# Patient Record
Sex: MALE | Race: OTHER | Employment: OTHER | URBAN - METROPOLITAN AREA
[De-identification: names, ages, dates, MRNs, and addresses within clinical notes are randomized per-mention and may not be internally consistent; named-entity substitution may affect disease eponyms.]

---

## 2020-06-10 ENCOUNTER — HOSPITAL ENCOUNTER (OUTPATIENT)
Age: 75
Setting detail: OBSERVATION
Discharge: HOME | End: 2020-06-12
Attending: EMERGENCY MEDICINE | Admitting: HOSPITALIST
Payer: MEDICARE

## 2020-06-10 DIAGNOSIS — N17.9 AKI (ACUTE KIDNEY INJURY) (HCC): ICD-10-CM

## 2020-06-10 DIAGNOSIS — K92.0 COFFEE GROUND EMESIS: Primary | ICD-10-CM

## 2020-06-10 DIAGNOSIS — R73.9 HYPERGLYCEMIA: ICD-10-CM

## 2020-06-10 PROBLEM — K92.2 UGIB (UPPER GASTROINTESTINAL BLEED): Status: ACTIVE | Noted: 2020-06-10

## 2020-06-10 LAB
ABO + RH BLD: NORMAL
ALBUMIN SERPL-MCNC: 3.4 G/DL (ref 3.4–5)
ALBUMIN/GLOB SERPL: 0.8 {RATIO} (ref 0.8–1.7)
ALP SERPL-CCNC: 108 U/L (ref 45–117)
ALT SERPL-CCNC: 22 U/L (ref 16–61)
ANION GAP SERPL CALC-SCNC: 6 MMOL/L (ref 3–18)
APTT PPP: 41.1 SEC (ref 23–36.4)
AST SERPL-CCNC: 22 U/L (ref 10–38)
ATRIAL RATE: 74 BPM
BASOPHILS # BLD: 0 K/UL (ref 0–0.1)
BASOPHILS NFR BLD: 0 % (ref 0–2)
BILIRUB SERPL-MCNC: 0.8 MG/DL (ref 0.2–1)
BLOOD GROUP ANTIBODIES SERPL: NORMAL
BUN SERPL-MCNC: 59 MG/DL (ref 7–18)
BUN/CREAT SERPL: 20 (ref 12–20)
CALCIUM SERPL-MCNC: 8.6 MG/DL (ref 8.5–10.1)
CALCULATED P AXIS, ECG09: 53 DEGREES
CALCULATED R AXIS, ECG10: 7 DEGREES
CALCULATED T AXIS, ECG11: 135 DEGREES
CHLORIDE SERPL-SCNC: 109 MMOL/L (ref 100–111)
CO2 SERPL-SCNC: 26 MMOL/L (ref 21–32)
CREAT SERPL-MCNC: 2.94 MG/DL (ref 0.6–1.3)
DIAGNOSIS, 93000: NORMAL
DIFFERENTIAL METHOD BLD: ABNORMAL
EOSINOPHIL # BLD: 0.1 K/UL (ref 0–0.4)
EOSINOPHIL NFR BLD: 1 % (ref 0–5)
ERYTHROCYTE [DISTWIDTH] IN BLOOD BY AUTOMATED COUNT: 14.6 % (ref 11.6–14.5)
GLOBULIN SER CALC-MCNC: 4.3 G/DL (ref 2–4)
GLUCOSE SERPL-MCNC: 203 MG/DL (ref 74–99)
HCT VFR BLD AUTO: 43.5 % (ref 36–48)
HEMOCCULT STL QL: NEGATIVE
HGB BLD-MCNC: 13.4 G/DL (ref 13–16)
INR PPP: 2.6 (ref 0.8–1.2)
LIPASE SERPL-CCNC: 122 U/L (ref 73–393)
LYMPHOCYTES # BLD: 0.9 K/UL (ref 0.9–3.6)
LYMPHOCYTES NFR BLD: 10 % (ref 21–52)
MCH RBC QN AUTO: 27.9 PG (ref 24–34)
MCHC RBC AUTO-ENTMCNC: 30.8 G/DL (ref 31–37)
MCV RBC AUTO: 90.4 FL (ref 74–97)
MONOCYTES # BLD: 0.6 K/UL (ref 0.05–1.2)
MONOCYTES NFR BLD: 6 % (ref 3–10)
NEUTS SEG # BLD: 7.7 K/UL (ref 1.8–8)
NEUTS SEG NFR BLD: 83 % (ref 40–73)
P-R INTERVAL, ECG05: 166 MS
PLATELET # BLD AUTO: 178 K/UL (ref 135–420)
PMV BLD AUTO: 11.1 FL (ref 9.2–11.8)
POTASSIUM SERPL-SCNC: 4.7 MMOL/L (ref 3.5–5.5)
PROT SERPL-MCNC: 7.7 G/DL (ref 6.4–8.2)
PROTHROMBIN TIME: 27.7 SEC (ref 11.5–15.2)
Q-T INTERVAL, ECG07: 380 MS
QRS DURATION, ECG06: 96 MS
QTC CALCULATION (BEZET), ECG08: 421 MS
RBC # BLD AUTO: 4.81 M/UL (ref 4.7–5.5)
SODIUM SERPL-SCNC: 141 MMOL/L (ref 136–145)
SPECIMEN EXP DATE BLD: NORMAL
VENTRICULAR RATE, ECG03: 74 BPM
WBC # BLD AUTO: 9.3 K/UL (ref 4.6–13.2)

## 2020-06-10 PROCEDURE — 65660000000 HC RM CCU STEPDOWN

## 2020-06-10 PROCEDURE — 85730 THROMBOPLASTIN TIME PARTIAL: CPT

## 2020-06-10 PROCEDURE — 96361 HYDRATE IV INFUSION ADD-ON: CPT

## 2020-06-10 PROCEDURE — 74011250636 HC RX REV CODE- 250/636: Performed by: FAMILY MEDICINE

## 2020-06-10 PROCEDURE — 85610 PROTHROMBIN TIME: CPT

## 2020-06-10 PROCEDURE — 82272 OCCULT BLD FECES 1-3 TESTS: CPT

## 2020-06-10 PROCEDURE — 65390000012 HC CONDITION CODE 44 OBSERVATION

## 2020-06-10 PROCEDURE — 93005 ELECTROCARDIOGRAM TRACING: CPT

## 2020-06-10 PROCEDURE — 83690 ASSAY OF LIPASE: CPT

## 2020-06-10 PROCEDURE — 85025 COMPLETE CBC W/AUTO DIFF WBC: CPT

## 2020-06-10 PROCEDURE — 74011250636 HC RX REV CODE- 250/636: Performed by: EMERGENCY MEDICINE

## 2020-06-10 PROCEDURE — 96374 THER/PROPH/DIAG INJ IV PUSH: CPT

## 2020-06-10 PROCEDURE — 99284 EMERGENCY DEPT VISIT MOD MDM: CPT

## 2020-06-10 PROCEDURE — 80053 COMPREHEN METABOLIC PANEL: CPT

## 2020-06-10 PROCEDURE — 86900 BLOOD TYPING SEROLOGIC ABO: CPT

## 2020-06-10 PROCEDURE — C9113 INJ PANTOPRAZOLE SODIUM, VIA: HCPCS | Performed by: EMERGENCY MEDICINE

## 2020-06-10 RX ORDER — ONDANSETRON 2 MG/ML
4 INJECTION INTRAMUSCULAR; INTRAVENOUS
Status: DISCONTINUED | OUTPATIENT
Start: 2020-06-10 | End: 2020-06-12 | Stop reason: HOSPADM

## 2020-06-10 RX ORDER — MAGNESIUM SULFATE 100 %
16 CRYSTALS MISCELLANEOUS AS NEEDED
Status: DISCONTINUED | OUTPATIENT
Start: 2020-06-10 | End: 2020-06-12 | Stop reason: HOSPADM

## 2020-06-10 RX ORDER — SODIUM CHLORIDE 0.9 % (FLUSH) 0.9 %
5-40 SYRINGE (ML) INJECTION AS NEEDED
Status: DISCONTINUED | OUTPATIENT
Start: 2020-06-10 | End: 2020-06-12 | Stop reason: HOSPADM

## 2020-06-10 RX ORDER — METOPROLOL TARTRATE 25 MG/1
25 TABLET, FILM COATED ORAL EVERY 12 HOURS
Status: DISCONTINUED | OUTPATIENT
Start: 2020-06-10 | End: 2020-06-12 | Stop reason: HOSPADM

## 2020-06-10 RX ORDER — HYDRALAZINE HYDROCHLORIDE 25 MG/1
25 TABLET, FILM COATED ORAL 2 TIMES DAILY
COMMUNITY

## 2020-06-10 RX ORDER — MELATONIN
2000 DAILY
Status: DISCONTINUED | OUTPATIENT
Start: 2020-06-11 | End: 2020-06-12 | Stop reason: HOSPADM

## 2020-06-10 RX ORDER — PANTOPRAZOLE SODIUM 40 MG/10ML
80 INJECTION, POWDER, LYOPHILIZED, FOR SOLUTION INTRAVENOUS
Status: COMPLETED | OUTPATIENT
Start: 2020-06-10 | End: 2020-06-10

## 2020-06-10 RX ORDER — SODIUM CHLORIDE 9 MG/ML
75 INJECTION, SOLUTION INTRAVENOUS CONTINUOUS
Status: DISCONTINUED | OUTPATIENT
Start: 2020-06-10 | End: 2020-06-12 | Stop reason: HOSPADM

## 2020-06-10 RX ORDER — HYDRALAZINE HYDROCHLORIDE 25 MG/1
25 TABLET, FILM COATED ORAL 2 TIMES DAILY
Status: DISCONTINUED | OUTPATIENT
Start: 2020-06-11 | End: 2020-06-12 | Stop reason: HOSPADM

## 2020-06-10 RX ORDER — DEXTROSE MONOHYDRATE 100 MG/ML
125-250 INJECTION, SOLUTION INTRAVENOUS AS NEEDED
Status: DISCONTINUED | OUTPATIENT
Start: 2020-06-10 | End: 2020-06-12 | Stop reason: HOSPADM

## 2020-06-10 RX ORDER — CHOLECALCIFEROL (VITAMIN D3) 125 MCG
5000 CAPSULE ORAL DAILY
COMMUNITY

## 2020-06-10 RX ORDER — WARFARIN 6 MG/1
6 TABLET ORAL DAILY
COMMUNITY

## 2020-06-10 RX ORDER — SPIRONOLACTONE 25 MG/1
25 TABLET ORAL DAILY
Status: DISCONTINUED | OUTPATIENT
Start: 2020-06-11 | End: 2020-06-12 | Stop reason: HOSPADM

## 2020-06-10 RX ORDER — SODIUM CHLORIDE 0.9 % (FLUSH) 0.9 %
5-40 SYRINGE (ML) INJECTION EVERY 8 HOURS
Status: DISCONTINUED | OUTPATIENT
Start: 2020-06-10 | End: 2020-06-12 | Stop reason: HOSPADM

## 2020-06-10 RX ORDER — ATORVASTATIN CALCIUM 20 MG/1
20 TABLET, FILM COATED ORAL DAILY
Status: DISCONTINUED | OUTPATIENT
Start: 2020-06-11 | End: 2020-06-12 | Stop reason: HOSPADM

## 2020-06-10 RX ORDER — MULTIVITAMIN
2000 TABLET ORAL DAILY
COMMUNITY

## 2020-06-10 RX ORDER — ISOSORBIDE MONONITRATE 30 MG/1
30 TABLET, EXTENDED RELEASE ORAL DAILY
Status: DISCONTINUED | OUTPATIENT
Start: 2020-06-11 | End: 2020-06-12 | Stop reason: HOSPADM

## 2020-06-10 RX ORDER — INSULIN LISPRO 100 [IU]/ML
INJECTION, SOLUTION INTRAVENOUS; SUBCUTANEOUS
Status: DISCONTINUED | OUTPATIENT
Start: 2020-06-11 | End: 2020-06-12 | Stop reason: HOSPADM

## 2020-06-10 RX ORDER — ACETAMINOPHEN 160 MG/5ML
400 SUSPENSION, ORAL (FINAL DOSE FORM) ORAL DAILY
COMMUNITY

## 2020-06-10 RX ADMIN — SODIUM CHLORIDE 500 ML: 900 INJECTION, SOLUTION INTRAVENOUS at 21:04

## 2020-06-10 RX ADMIN — SODIUM CHLORIDE 125 ML/HR: 900 INJECTION, SOLUTION INTRAVENOUS at 22:15

## 2020-06-10 RX ADMIN — SODIUM CHLORIDE 500 ML: 900 INJECTION, SOLUTION INTRAVENOUS at 20:09

## 2020-06-10 RX ADMIN — PANTOPRAZOLE SODIUM 80 MG: 40 INJECTION, POWDER, FOR SOLUTION INTRAVENOUS at 20:09

## 2020-06-10 NOTE — ED TRIAGE NOTES
Patient has been vomiting since 4am. Wife states vomit now looks like coffee grounds and PCP instructed to go to ED. Patients wife reports patient is on warfarin. Patient noted to have right arm weakness and right facial droop. Patient states hard to move right arm. Patients wife reports history of stroke in previous years and that this is not new.

## 2020-06-10 NOTE — ED PROVIDER NOTES
EMERGENCY DEPARTMENT HISTORY AND PHYSICAL EXAM    Date: 6/10/2020  Patient Name: Ranulfo Morin    History of Presenting Illness     Chief Complaint   Patient presents with    Vomiting    Extremity Weakness         History Provided By: Patient    Nir Cornejo is a 77 y.o. male with PMHX of stroke, MI, hypertension, CAD, on Coumadin who presents to the emergency department C/O vomiting around 4 AM.  Patient has been having nausea no abdominal pain. Wife reports that his emesis looks coffee-ground and took a picture. Patient had stroke in the past and is a poor historian not able to elaborate much on symptoms. Wife reports that he has had episodes 3 of nausea and vomiting since last Monday. She says that he is been complaining of burning and acid reflux type of pain after meals. She says today was the first time he threw up coffee ground emesis. Reports his bowel movements today look totally normal. She says he otherwise looks at his baseline and has not been having pain. Patient took meds today after throwing up but has not had anything to eat/drink. PCP: Chrissy Javier MD    Current Outpatient Medications   Medication Sig Dispense Refill    warfarin (COUMADIN) 6 mg tablet Take 6 mg by mouth daily. T,TH,SUN      hydrALAZINE (APRESOLINE) 25 mg tablet Take 25 mg by mouth two (2) times a day.  cholecalciferol, vitamin D3, (Vitamin D3) 50 mcg (2,000 unit) tab Take 5,000 Units by mouth daily.  coenzyme Q-10 (Co Q-10) 200 mg capsule Take 400 mg by mouth daily.  cinnamon bark (Cinnamon) 500 mg cap Take 2,000 mg by mouth daily.  aspirin delayed-release 81 mg tablet Take 1 Tab by mouth daily. 30 Tab 0    warfarin (COUMADIN) 3 mg tablet Take 2 Tabs by mouth daily. (Patient taking differently: Take 3 mg by mouth daily. M,W,F,SAT) 60 Tab 0    glipiZIDE (GLUCOTROL) 5 mg tablet Take 2.5 mg by mouth daily.  With breakfast      spironolactone (ALDACTONE) 25 mg tablet Take 1 Tab by mouth daily. 30 Tab 0    metoprolol (LOPRESSOR) 25 mg tablet Take 1 Tab by mouth every twelve (12) hours. 60 Tab 0    isosorbide mononitrate ER (IMDUR) 30 mg tablet Take 1 Tab by mouth daily. 30 Tab 0    furosemide (LASIX) 40 mg tablet Take 1 Tab by mouth two (2) times a day. (Patient taking differently: Take 20 mg by mouth daily.) 60 Tab 0    atorvastatin (LIPITOR) 20 mg tablet Take 20 mg by mouth daily.  krill-omega-3-dha-epa-lipids 041-15-97-25 mg cap Take  by mouth. Past History     Past Medical History:  Past Medical History:   Diagnosis Date    CAD (coronary artery disease)     Diabetes [facility] [address]     Hypercholesterolemia     Hypertension     Myocardial infarction ([facility] [address]     S/P coronary artery stent placement 2009    Stroke [facility])     patient states \"little\"    Stroke ([facility] [address] 2011    sugar related (BS >400)       Past Surgical History:  Past Surgical History:   Procedure Laterality Date    CARDIAC SURG PROCEDURE UNLIST  [id number]    angioplasty and stenting    CORONARY ARTERY ANGIOGRAM  2016    HX CORONARY STENT PLACEMENT  2009    stents    HX HEART CATHETERIZATION  2009    and stents    HX HERNIA REPAIR      HX OTHER SURGICAL      Nephrolithotomy    HX PROSTATECTOMY  2003    HX PTCA  2009    LEFT HEART PERCUTANEOUS  2016       Family History:  History reviewed. No pertinent family history. Social History:  Social History     Tobacco Use    Smoking status: Former Smoker     Packs/day: 1.00     Years: 45.00     Pack years: 45.00     Types: Cigarettes     Last attempt to quit: 3/31/2002     Years since quittin.2    Smokeless tobacco: Never Used   Substance Use Topics    Alcohol use: No    Drug use: No       Allergies:  No Known Allergies      Review of Systems   Review of Systems   Constitutional: Positive for appetite change. Negative for chills and fever. Respiratory: Negative for shortness of breath.     Cardiovascular: Negative for chest pain.   Gastrointestinal: Positive for nausea and vomiting. Negative for abdominal pain, anal bleeding and blood in stool. All other systems reviewed and are negative. Physical Exam     Vitals:    06/10/20 1915 06/10/20 2006   BP: 104/53 94/65   Pulse: 72    Resp: 16    Temp: 97.8 °F (36.6 °C)    SpO2: 99% 98%   Weight: 90.3 kg (199 lb)      Physical Exam  Constitutional:       Appearance: He is ill-appearing (appears nauseous). He is not toxic-appearing. HENT:      Head: Normocephalic and atraumatic. Eyes:      Extraocular Movements: Extraocular movements intact. Comments: Clouded right cornea     Neck:      Musculoskeletal: Normal range of motion and neck supple. Cardiovascular:      Rate and Rhythm: Normal rate and regular rhythm. Pulses: Normal pulses. Heart sounds: Normal heart sounds. No murmur. Pulmonary:      Effort: Pulmonary effort is normal.      Breath sounds: Normal breath sounds. No wheezing. Abdominal:      General: There is no distension. Palpations: Abdomen is soft. Tenderness: There is no abdominal tenderness. There is no guarding or rebound. Musculoskeletal:      Right lower leg: No edema. Left lower leg: No edema. Skin:     General: Skin is warm and dry. Neurological:      Mental Status: He is alert. Mental status is at baseline. Cranial Nerves: Facial asymmetry present. Motor: Weakness (RUE,RLE (chronic) and abnormal muscle tone (RUE) present.              Diagnostic Study Results     Labs -     Recent Results (from the past 12 hour(s))   CBC WITH AUTOMATED DIFF    Collection Time: 06/10/20  7:40 PM   Result Value Ref Range    WBC 9.3 4.6 - 13.2 K/uL    RBC 4.81 4.70 - 5.50 M/uL    HGB 13.4 13.0 - 16.0 g/dL    HCT 43.5 36.0 - 48.0 %    MCV 90.4 74.0 - 97.0 FL    MCH 27.9 24.0 - 34.0 PG    MCHC 30.8 (L) 31.0 - 37.0 g/dL    RDW 14.6 (H) 11.6 - 14.5 %    PLATELET 344 613 - 838 K/uL    MPV 11.1 9.2 - 11.8 FL    NEUTROPHILS 83 (H) 40 - 73 %    LYMPHOCYTES 10 (L) 21 - 52 %    MONOCYTES 6 3 - 10 %    EOSINOPHILS 1 0 - 5 %    BASOPHILS 0 0 - 2 %    ABS. NEUTROPHILS 7.7 1.8 - 8.0 K/UL    ABS. LYMPHOCYTES 0.9 0.9 - 3.6 K/UL    ABS. MONOCYTES 0.6 0.05 - 1.2 K/UL    ABS. EOSINOPHILS 0.1 0.0 - 0.4 K/UL    ABS. BASOPHILS 0.0 0.0 - 0.1 K/UL    DF AUTOMATED     METABOLIC PANEL, COMPREHENSIVE    Collection Time: 06/10/20  7:40 PM   Result Value Ref Range    Sodium 141 136 - 145 mmol/L    Potassium 4.7 3.5 - 5.5 mmol/L    Chloride 109 100 - 111 mmol/L    CO2 26 21 - 32 mmol/L    Anion gap 6 3.0 - 18 mmol/L    Glucose 203 (H) 74 - 99 mg/dL    BUN 59 (H) 7.0 - 18 MG/DL    Creatinine 2.94 (H) 0.6 - 1.3 MG/DL    BUN/Creatinine ratio 20 12 - 20      GFR est AA 26 (L) >60 ml/min/1.73m2    GFR est non-AA 21 (L) >60 ml/min/1.73m2    Calcium 8.6 8.5 - 10.1 MG/DL    Bilirubin, total 0.8 0.2 - 1.0 MG/DL    ALT (SGPT) 22 16 - 61 U/L    AST (SGOT) 22 10 - 38 U/L    Alk.  phosphatase 108 45 - 117 U/L    Protein, total 7.7 6.4 - 8.2 g/dL    Albumin 3.4 3.4 - 5.0 g/dL    Globulin 4.3 (H) 2.0 - 4.0 g/dL    A-G Ratio 0.8 0.8 - 1.7     LIPASE    Collection Time: 06/10/20  7:40 PM   Result Value Ref Range    Lipase 122 73 - 393 U/L   PROTHROMBIN TIME + INR    Collection Time: 06/10/20  7:40 PM   Result Value Ref Range    Prothrombin time 27.7 (H) 11.5 - 15.2 sec    INR 2.6 (H) 0.8 - 1.2     PTT    Collection Time: 06/10/20  7:40 PM   Result Value Ref Range    aPTT 41.1 (H) 23.0 - 36.4 SEC   EKG, 12 LEAD, INITIAL    Collection Time: 06/10/20  7:55 PM   Result Value Ref Range    Ventricular Rate 74 BPM    Atrial Rate 74 BPM    P-R Interval 166 ms    QRS Duration 96 ms    Q-T Interval 380 ms    QTC Calculation (Bezet) 421 ms    Calculated P Axis 53 degrees    Calculated R Axis 7 degrees    Calculated T Axis 135 degrees    Diagnosis       Normal sinus rhythm  T wave abnormality, consider anterolateral ischemia  Abnormal ECG  Confirmed by Rishabh Del Real MD, Cal Cervantes ([id number]) on 6/10/2020 8:45:15 PM     OCCULT BLOOD, STOOL    Collection Time: 06/10/20  8:08 PM   Result Value Ref Range    Occult blood, stool Negative NEG         Radiologic Studies -   No orders to display     CT Results  (Last 48 hours)    None        CXR Results  (Last 48 hours)    None          Medications given in the ED-  Medications   pantoprazole (PROTONIX) injection 80 mg (80 mg IntraVENous Given 6/10/20 2009)   sodium chloride 0.9 % bolus infusion 500 mL (0 mL IntraVENous IV Completed 6/10/20 2104)   sodium chloride 0.9 % bolus infusion 500 mL (500 mL IntraVENous New Bag 6/10/20 2104)         Medical Decision Making   I am the first provider for this patient. I reviewed the vital signs, available nursing notes, past medical history, past surgical history, family history and social history. Vital Signs-Reviewed the patient's vital signs. Pulse Oximetry Analysis - 99% on RA, normal       EKG interpretation: (Preliminary)  EKG read by Dr. Cuca Richards at [id number]   Normal intervals, normal axis, lateral T wave inversions, similar to prior study 2016    Records Reviewed: Nursing Notes, Old Medical Records and Previous electrocardiograms    Provider Notes (Medical Decision Making): Lloyd Negrete is a 77 y.o. male with UGIB on coumadin. Hemodynamically stable. Melena. Single episode of coffee-ground emesis this AM.  Plan on discussing with GI and admission for scope    Procedures:  Procedures    ED Course:   CONSULT NOTE:   8:19 PM   I discussed care with Dr Nyasia Lagos It was a standard discussion, including history of patients chief complaint, available diagnostic results, and treatment course. Discussed case. Recommends PPI, holding Coumadin and aspirin and will be able to scope patient on Friday    CONSULT NOTE:   9:05 PM   I discussed care with Dr Urmila Lou It was a standard discussion, including history of patients chief complaint, available diagnostic results, and treatment course. Discussed case.  Agrees with admission. Diagnosis and Disposition     Critical Care Time:     Core Measures:  For Hospitalized Patients:    1. Hospitalization Decision Time:  The decision to hospitalize the patient was made by Dr Merlinda Elms at 2106 on 6/10/2020    2. Aspirin: Aspirin was not given because the patient is a bleeding risk    9:06 PM  Patient is being admitted to the hospital by Dr Tonja Woo. The results of their tests and reasons for their admission have been discussed with them and/or available family. They convey agreement and understanding for the need to be admitted and for their admission diagnosis. CONDITIONS ON ADMISSION:  Sepsis is not present at the time of admission. Deep Vein Thrombosis is not present at the time of admission. Thrombosis is not present at the time of admission. Urinary Tract Infection is not present at the time of admission. Pneumonia is not present at the time of admission. MRSA is not present at the time of admission. Wound infection is not present at the time of admission. Pressure Ulcer is not present at the time of admission. CLINICAL IMPRESSION:    1. Coffee ground emesis    2. АЛЕКСАНДР (acute kidney injury) ([facility] [address]    3. Hyperglycemia      _______________________________      Please note that this dictation was completed with [employer], the computer voice recognition software. Quite often unanticipated grammatical, syntax, homophones, and other interpretive errors are inadvertently transcribed by the computer software. Please disregard these errors. Please excuse any errors that have escaped final proofreading.

## 2020-06-11 PROBLEM — N17.9 ACUTE RENAL FAILURE SUPERIMPOSED ON STAGE 3 CHRONIC KIDNEY DISEASE (HCC): Status: ACTIVE | Noted: 2020-06-11

## 2020-06-11 PROBLEM — N18.30 ACUTE RENAL FAILURE SUPERIMPOSED ON STAGE 3 CHRONIC KIDNEY DISEASE (HCC): Status: ACTIVE | Noted: 2020-06-11

## 2020-06-11 PROBLEM — K22.10 EROSIVE ESOPHAGITIS: Status: ACTIVE | Noted: 2020-06-11

## 2020-06-11 LAB
ALBUMIN SERPL-MCNC: 2.6 G/DL (ref 3.4–5)
ALBUMIN/GLOB SERPL: 0.8 {RATIO} (ref 0.8–1.7)
ALP SERPL-CCNC: 76 U/L (ref 45–117)
ALT SERPL-CCNC: 17 U/L (ref 16–61)
ANION GAP SERPL CALC-SCNC: 6 MMOL/L (ref 3–18)
AST SERPL-CCNC: 18 U/L (ref 10–38)
BASOPHILS # BLD: 0 K/UL (ref 0–0.1)
BASOPHILS NFR BLD: 0 % (ref 0–2)
BILIRUB DIRECT SERPL-MCNC: 0.1 MG/DL (ref 0–0.2)
BILIRUB SERPL-MCNC: 0.5 MG/DL (ref 0.2–1)
BUN SERPL-MCNC: 55 MG/DL (ref 7–18)
BUN/CREAT SERPL: 23 (ref 12–20)
CALCIUM SERPL-MCNC: 7.6 MG/DL (ref 8.5–10.1)
CHLORIDE SERPL-SCNC: 113 MMOL/L (ref 100–111)
CO2 SERPL-SCNC: 24 MMOL/L (ref 21–32)
CREAT SERPL-MCNC: 2.4 MG/DL (ref 0.6–1.3)
DIFFERENTIAL METHOD BLD: ABNORMAL
EOSINOPHIL # BLD: 0.1 K/UL (ref 0–0.4)
EOSINOPHIL NFR BLD: 1 % (ref 0–5)
ERYTHROCYTE [DISTWIDTH] IN BLOOD BY AUTOMATED COUNT: 14.8 % (ref 11.6–14.5)
GLOBULIN SER CALC-MCNC: 3.2 G/DL (ref 2–4)
GLUCOSE BLD STRIP.AUTO-MCNC: 113 MG/DL (ref 70–110)
GLUCOSE BLD STRIP.AUTO-MCNC: 115 MG/DL (ref 70–110)
GLUCOSE BLD STRIP.AUTO-MCNC: 130 MG/DL (ref 70–110)
GLUCOSE BLD STRIP.AUTO-MCNC: 93 MG/DL (ref 70–110)
GLUCOSE SERPL-MCNC: 100 MG/DL (ref 74–99)
HCT VFR BLD AUTO: 32.9 % (ref 36–48)
HCT VFR BLD AUTO: 33.8 % (ref 36–48)
HCT VFR BLD AUTO: 35.5 % (ref 36–48)
HCT VFR BLD AUTO: 36.9 % (ref 36–48)
HGB BLD-MCNC: 10.1 G/DL (ref 13–16)
HGB BLD-MCNC: 10.3 G/DL (ref 13–16)
HGB BLD-MCNC: 10.8 G/DL (ref 13–16)
HGB BLD-MCNC: 11.4 G/DL (ref 13–16)
LYMPHOCYTES # BLD: 1.4 K/UL (ref 0.9–3.6)
LYMPHOCYTES NFR BLD: 18 % (ref 21–52)
MCH RBC QN AUTO: 27.6 PG (ref 24–34)
MCHC RBC AUTO-ENTMCNC: 30.4 G/DL (ref 31–37)
MCV RBC AUTO: 90.8 FL (ref 74–97)
MONOCYTES # BLD: 0.7 K/UL (ref 0.05–1.2)
MONOCYTES NFR BLD: 8 % (ref 3–10)
NEUTS SEG # BLD: 5.6 K/UL (ref 1.8–8)
NEUTS SEG NFR BLD: 73 % (ref 40–73)
PLATELET # BLD AUTO: 142 K/UL (ref 135–420)
PMV BLD AUTO: 11.4 FL (ref 9.2–11.8)
POTASSIUM SERPL-SCNC: 4 MMOL/L (ref 3.5–5.5)
PROT SERPL-MCNC: 5.8 G/DL (ref 6.4–8.2)
RBC # BLD AUTO: 3.91 M/UL (ref 4.7–5.5)
SODIUM SERPL-SCNC: 143 MMOL/L (ref 136–145)
WBC # BLD AUTO: 7.7 K/UL (ref 4.6–13.2)

## 2020-06-11 PROCEDURE — 74011250637 HC RX REV CODE- 250/637: Performed by: FAMILY MEDICINE

## 2020-06-11 PROCEDURE — 74011250636 HC RX REV CODE- 250/636: Performed by: INTERNAL MEDICINE

## 2020-06-11 PROCEDURE — 82962 GLUCOSE BLOOD TEST: CPT

## 2020-06-11 PROCEDURE — 65390000012 HC CONDITION CODE 44 OBSERVATION

## 2020-06-11 PROCEDURE — 36415 COLL VENOUS BLD VENIPUNCTURE: CPT

## 2020-06-11 PROCEDURE — 80048 BASIC METABOLIC PNL TOTAL CA: CPT

## 2020-06-11 PROCEDURE — 77030040361 HC SLV COMPR DVT MDII -B: Performed by: INTERNAL MEDICINE

## 2020-06-11 PROCEDURE — C9113 INJ PANTOPRAZOLE SODIUM, VIA: HCPCS | Performed by: FAMILY MEDICINE

## 2020-06-11 PROCEDURE — 74011250636 HC RX REV CODE- 250/636: Performed by: FAMILY MEDICINE

## 2020-06-11 PROCEDURE — 97166 OT EVAL MOD COMPLEX 45 MIN: CPT

## 2020-06-11 PROCEDURE — 99218 HC RM OBSERVATION: CPT

## 2020-06-11 PROCEDURE — 97161 PT EVAL LOW COMPLEX 20 MIN: CPT

## 2020-06-11 PROCEDURE — 85018 HEMOGLOBIN: CPT

## 2020-06-11 PROCEDURE — 74011000250 HC RX REV CODE- 250: Performed by: FAMILY MEDICINE

## 2020-06-11 PROCEDURE — 85025 COMPLETE CBC W/AUTO DIFF WBC: CPT

## 2020-06-11 PROCEDURE — 74011250637 HC RX REV CODE- 250/637: Performed by: HOSPITALIST

## 2020-06-11 PROCEDURE — 76040000007: Performed by: INTERNAL MEDICINE

## 2020-06-11 PROCEDURE — G0500 MOD SEDAT ENDO SERVICE >5YRS: HCPCS | Performed by: INTERNAL MEDICINE

## 2020-06-11 PROCEDURE — 80076 HEPATIC FUNCTION PANEL: CPT

## 2020-06-11 RX ORDER — SODIUM CHLORIDE 9 MG/ML
1000 INJECTION, SOLUTION INTRAVENOUS CONTINUOUS
Status: CANCELLED | OUTPATIENT
Start: 2020-06-11 | End: 2020-06-11

## 2020-06-11 RX ORDER — FENTANYL CITRATE 50 UG/ML
100 INJECTION, SOLUTION INTRAMUSCULAR; INTRAVENOUS
Status: DISCONTINUED | OUTPATIENT
Start: 2020-06-11 | End: 2020-06-11 | Stop reason: HOSPADM

## 2020-06-11 RX ORDER — MIDAZOLAM HYDROCHLORIDE 1 MG/ML
.25-5 INJECTION, SOLUTION INTRAMUSCULAR; INTRAVENOUS
Status: DISCONTINUED | OUTPATIENT
Start: 2020-06-11 | End: 2020-06-11 | Stop reason: HOSPADM

## 2020-06-11 RX ORDER — DIPHENHYDRAMINE HYDROCHLORIDE 50 MG/ML
50 INJECTION, SOLUTION INTRAMUSCULAR; INTRAVENOUS ONCE
Status: CANCELLED | OUTPATIENT
Start: 2020-06-11 | End: 2020-06-11

## 2020-06-11 RX ORDER — ASPIRIN 81 MG/1
81 TABLET ORAL DAILY
Status: DISCONTINUED | OUTPATIENT
Start: 2020-06-11 | End: 2020-06-12 | Stop reason: HOSPADM

## 2020-06-11 RX ORDER — SODIUM CHLORIDE 0.9 % (FLUSH) 0.9 %
5-40 SYRINGE (ML) INJECTION EVERY 8 HOURS
Status: CANCELLED | OUTPATIENT
Start: 2020-06-11

## 2020-06-11 RX ORDER — FLUMAZENIL 0.1 MG/ML
0.2 INJECTION INTRAVENOUS
Status: DISCONTINUED | OUTPATIENT
Start: 2020-06-11 | End: 2020-06-11 | Stop reason: HOSPADM

## 2020-06-11 RX ORDER — NALOXONE HYDROCHLORIDE 0.4 MG/ML
0.4 INJECTION, SOLUTION INTRAMUSCULAR; INTRAVENOUS; SUBCUTANEOUS
Status: DISCONTINUED | OUTPATIENT
Start: 2020-06-11 | End: 2020-06-11 | Stop reason: HOSPADM

## 2020-06-11 RX ORDER — DEXTROMETHORPHAN/PSEUDOEPHED 2.5-7.5/.8
1.2 DROPS ORAL
Status: CANCELLED | OUTPATIENT
Start: 2020-06-11

## 2020-06-11 RX ORDER — ATROPINE SULFATE 0.1 MG/ML
0.5 INJECTION INTRAVENOUS
Status: CANCELLED | OUTPATIENT
Start: 2020-06-11 | End: 2020-06-12

## 2020-06-11 RX ORDER — SODIUM CHLORIDE 0.9 % (FLUSH) 0.9 %
5-40 SYRINGE (ML) INJECTION AS NEEDED
Status: CANCELLED | OUTPATIENT
Start: 2020-06-11

## 2020-06-11 RX ORDER — WARFARIN 3 MG/1
6 TABLET ORAL ONCE
Status: COMPLETED | OUTPATIENT
Start: 2020-06-11 | End: 2020-06-11

## 2020-06-11 RX ORDER — EPINEPHRINE 0.1 MG/ML
1 INJECTION INTRACARDIAC; INTRAVENOUS
Status: CANCELLED | OUTPATIENT
Start: 2020-06-11 | End: 2020-06-12

## 2020-06-11 RX ADMIN — ATORVASTATIN CALCIUM 20 MG: 20 TABLET, FILM COATED ORAL at 10:53

## 2020-06-11 RX ADMIN — ISOSORBIDE MONONITRATE 30 MG: 30 TABLET, EXTENDED RELEASE ORAL at 10:53

## 2020-06-11 RX ADMIN — VITAMIN D, TAB 1000IU (100/BT) 2 TABLET: 25 TAB at 10:53

## 2020-06-11 RX ADMIN — Medication 10 ML: at 22:19

## 2020-06-11 RX ADMIN — WARFARIN SODIUM 6 MG: 3 TABLET ORAL at 18:09

## 2020-06-11 RX ADMIN — SODIUM CHLORIDE 40 MG: 9 INJECTION, SOLUTION INTRAMUSCULAR; INTRAVENOUS; SUBCUTANEOUS at 22:19

## 2020-06-11 RX ADMIN — ASPIRIN 81 MG: 81 TABLET, COATED ORAL at 13:27

## 2020-06-11 RX ADMIN — METOPROLOL TARTRATE 25 MG: 25 TABLET, FILM COATED ORAL at 22:19

## 2020-06-11 NOTE — PERIOP NOTES
Report called to Abby Monteiro RN on [address]; Fentanyl 100 mcg and Versed 7 mg given during the procedure.  Patient responds to verbal stimuli, VSS, O2 sats 93% on RA

## 2020-06-11 NOTE — PROCEDURES
(EGD) Esophagogastroduodenoscopy (UPPER ENDOSCOPY) Procedure Note  [facility] [place]  __________________________________________________________________________________________________________________________      6/11/2020     Patient: Nataly Pena YOB: 1945 Gender: male Age: 77 y.o. INDICATION:  76 yo male on ASA and Coumadin for mural thrombus. Was brought last night by his wife because dyspepsia, heartburns for few days and one coffee ground emesis yesterday at 4 am but without any melena. His hgb dropped from 13.4 to 10.3. His INR yesterday was 2.6, BUN 5.9 and Creatinine 2.94    : Filemon Lopez MD    Referring Provider:  Lucero Isabel MD    Sedation:  Versed 7 mg IV, Fentanyl 100 mcg IV  Procedure Details:  After infomed consent was obtained for the procedure, with all risks and benefits of procedure explained to the family the patient was taken to the endoscopy suite and placed in the left lateral decubitus position. Following sequential administration of sedation as per above, the endoscope was inserted into the mouth and advanced under direct vision to third portion of the duodenum. A careful inspection was made as the gastroscope was withdrawn, including a retroflexed view of the proximal stomach; findings and interventions are described below. OROPHARYNX: The vocal Cords and the larynx could not seen. pyriform recesses normal.   ESOPHAGUS: The proximal oesophagus is normal. There is severe circumferential diffuse reflux induced erosive esophagitis spanning a maximum of 12 cm going up to 27 cm without any bleeding. The Z-Line is intact but slightly stenotic located at 39 cm. There is 2.5 cm hiatal hernia.  The diaphragmatic opening is located at 42 cm   STOMACH: The fundus on antegrade and retroflex views is normal. The cardia, body, lesser curvature, greater curvature, the and pylorus are normal. There is a tiny prepyloric erosion and a healed small prepyloric ulcer but no evidence of any gastritis, active ulcer or blood in the stomach. DUODENUM: The bulb, second, third,  portions and area of the major papilla are normal.  Therapies:  Nil    Specimen: none           Complications:   None    EBL:  Nil. IMPRESSION: severe circumferential diffuse reflux induced erosive esophagitis spanning a maximum of 12 cm going up to 27 cm without any bleeding. The Z-Line is intact but slightly stenotic located at 39 cm. There is 2.5 cm hiatal hernia. A tiny prepyloric erosion and a healed small prepyloric ulcer but no evidence of any gastritis, active ulcer or blood in the stomach. RECOMMENDATION:  He may be able to go home today and may be able to resume coumadin and ASA as before. Also resume antireflux and diabetic diet. Avoid all  NSAID's. Make a FU appointment at the office. Start taking Omeprazole 20 mg twice daily for 3 months then once daily for life consider repeating EGD in 6 months. Avoid eating late at night. Consider getting a screening colonoscopy as an outpatient if not done before!     Assistant: None    --Bobby Whitaker MD on 6/11/2020 at 9:21 AM

## 2020-06-11 NOTE — ED NOTES
Pt transported to room 360 by critical care tech. Pt on stretcher and cardiac monitor. Pt alert, skin w/d/p, respirations unlabored and even.

## 2020-06-11 NOTE — PROGRESS NOTES
2153 Pt arrived via stretcher from the ED to 360. The patient is alert and primary Arabic speaking although he appears to understand [address] Oriented him to room, call bell and unit routines. 2219 The patient was able to drink some apple juice and eat two jellos without pain or nausea. [id number] Shift Summary: Pt rested well overnight with no complaints. No new clinical concerns noted.      Nightshift Chart Audit Completed

## 2020-06-11 NOTE — PROGRESS NOTES
Oral and Written notification given to patient and/or caregiver informing them that they are currently an Outpatient receiving care in our facility. Outpatient services include Observation Services under [facility] requirements. Code 40 Letter given to patient/patent's wife via nurse and placed in patient's discharge packet on 6/11/2020. ANGELITO Donald, [facility].

## 2020-06-11 NOTE — ROUTINE PROCESS
Bedside and Verbal shift change report given to [address]  (oncoming nurse) by Andre White RN  (offgoing nurse). Report given with SBAR, Kardex, Intake/Output and Recent Results.

## 2020-06-11 NOTE — H&P
History & Physical    Patient: Tran Bae MRN: [id number]  CSN: [id number]    YOB: 1945  Age: 77 y.o. Sex: male      DOA: 6/10/2020  Primary Care Provider:  Kady Vargas MD      Assessment/Plan   77 y.o. male with past medical h/o CVA with residual weakness, MI, DM, hypertension, CAD, on Coumadin for a LV mural thrombus presents to the ED with coffee-ground emesis, admitted for upper GI bleed.      Upper GI bleed -  GI consult placed by ED, Dr. Yanelis Levin expertise  Discontinue aspirin and Coumadin  EGD planned for Friday (today is Wednesday evening)   Diet clears, avoid any red or orange foods  PPI, antiemetics    АЛЕКСАНДР -  Appears to have some component of CKD   However creatinine higher than last known in chart   May be due to hypovolemia   Hydrate and repeat BMP in AM     CAD -  Resume home meds   Monitor BP     DM -  SSI, FSBG qac and qhs, ADA      HTN -  Multiple home meds, held because low BP  Will monitor BP     CVA with residual deficits -  Supportive care    DVT prophylaxis -SCDs    GI prophylaxis -PPI      Patient Active Problem List   Diagnosis Code    CAD (coronary artery disease) I25.10    Moderate hypertension I10    Adult onset diabetes E11.9    S/P coronary artery stent placement Z95.5    Severe sepsis (HCC) A41.9, R65.20    Aspiration pneumonia ([address] J69.0    UTI (lower urinary tract infection) N39.0    АЛЕКСАНДР (acute kidney injury) ([address] N17.9    DM (diabetes mellitus) ([facility] K17.4    Systolic CHF, acute ([facility] I50.21    Unstable angina (HCC) I20.0    LV (left ventricular) mural thrombus I51.3    UGIB (upper gastrointestinal bleed) K92.2     Estimated length of stay: 2-3 days     CC: Vomited coffee ground emesis        HPI:     Tran Bae is a 77 y.o. male with past medical h/o CVA with residual weakness, MI, hypertension, CAD, on Coumadin for a LV mural thrombus who presents to the emergency department c/o  vomiting around 4 AM. Wife reports that his emesis looks like coffee-grounds and she apparently took a picture. Patient has had a CVA  in the past and is a poor historian due to residual deficits.      Wife reports that he has had multiple episodes of nausea, vomiting and burning and acid reflux type of pain after meals. She says today was the first time he threw up coffee ground emesis. Reports his bowel movements have looked normal without BRBPR or black tarry appearance. Patient took meds today after throwing up but has not had anything to eat/drink. Past Medical History:   Diagnosis Date    CAD (coronary artery disease)     Diabetes ([facility]     Hypercholesterolemia     Hypertension     Myocardial infarction ([facility]     S/P coronary artery stent placement 2009    Stroke [facility])     patient states \"little\"    Stroke ([facility] [facility] 2011    sugar related (BS >400)       Past Surgical History:   Procedure Laterality Date    CARDIAC SURG PROCEDURE UNLIST  [id number]    angioplasty and stenting    CORONARY ARTERY ANGIOGRAM  2016    HX CORONARY STENT PLACEMENT  2009    stents    HX HEART CATHETERIZATION  2009    and stents    HX HERNIA REPAIR      HX OTHER SURGICAL      Nephrolithotomy    HX PROSTATECTOMY      HX PTCA  2009    LEFT HEART PERCUTANEOUS  2016       History reviewed. No pertinent family history. Social History     Socioeconomic History    Marital status:      Spouse name: Not on file    Number of children: Not on file    Years of education: Not on file    Highest education level: Not on file   Tobacco Use    Smoking status: Former Smoker     Packs/day: 1.00     Years: 45.00     Pack years: 45.00     Types: Cigarettes     Last attempt to quit: 3/31/2002     Years since quittin.2    Smokeless tobacco: Never Used   Substance and Sexual Activity    Alcohol use: No    Drug use: No       Prior to Admission medications    Medication Sig Start Date End Date Taking?  Authorizing Provider warfarin (COUMADIN) 6 mg tablet Take 6 mg by mouth daily. T,TH,SUN   Yes Nilo Hawkins MD   hydrALAZINE (APRESOLINE) 25 mg tablet Take 25 mg by mouth two (2) times a day. Yes Ross, MD Nilo   cholecalciferol, vitamin D3, (Vitamin D3) 50 mcg (2,000 unit) tab Take 5,000 Units by mouth daily. Yes Nilo Hawkins MD   coenzyme Q-10 (Co Q-10) 200 mg capsule Take 400 mg by mouth daily. Yes Ross, MD Nilo   cinnamon bark (Cinnamon) 500 mg cap Take 2,000 mg by mouth daily. Yes Ross, MD Nilo   aspirin delayed-release 81 mg tablet Take 1 Tab by mouth daily. 12/3/16   Shay Crane MD   warfarin (COUMADIN) 3 mg tablet Take 2 Tabs by mouth daily. Patient taking differently: Take 3 mg by mouth daily. M,W,F,SAT 12/3/16   Shya Crane MD   glipiZIDE (GLUCOTROL) 5 mg tablet Take 2.5 mg by mouth daily. With breakfast    Nilo Hawkins MD   spironolactone (ALDACTONE) 25 mg tablet Take 1 Tab by mouth daily. 1/15/16   Rossy Holt MD   metoprolol (LOPRESSOR) 25 mg tablet Take 1 Tab by mouth every twelve (12) hours. 1/15/16   Rossy Holt MD   isosorbide mononitrate ER (IMDUR) 30 mg tablet Take 1 Tab by mouth daily. 1/15/16   Rossy Holt MD   furosemide (LASIX) 40 mg tablet Take 1 Tab by mouth two (2) times a day. Patient taking differently: Take 20 mg by mouth daily. 1/15/16   Rossy Holt MD   atorvastatin (LIPITOR) 20 mg tablet Take 20 mg by mouth daily. Nilo Hawkins MD   krill-omega-3-dha-epa-lipids 838-36-15-00 mg cap Take  by mouth. Nilo Hawkins MD       No Known Allergies    Review of Systems  Gen: No fever, chills, malaise, weight loss/gain. Heent: No headache, rhinorrhea, epistaxis, ear pain, hearing loss, sinus pain, neck pain/stiffness, sore throat. Heart: No chest pain, palpitations, DURBIN, pnd, or orthopnea. Resp: No cough, hemoptysis, wheezing and shortness of breath. GI: No nausea, vomiting, diarrhea, constipation, melena or hematochezia.    : No urinary obstruction, dysuria or hematuria. Derm: No rash, new skin lesion or pruritis. Musc/skeletal: no bone or joint complains. Vasc: No edema, cyanosis or claudication. Endo: No heat/cold intolerance, no polyuria,polydipsia or polyphagia. Neuro: No unilateral weakness, numbness, tingling. No seizures. Heme: No easy bruising or bleeding. Physical Exam:     Physical Exam:  Visit Vitals  BP 94/65   Pulse 72   Temp 97.8 °F (36.6 °C)   Resp 16   Wt 90.3 kg (199 lb)   SpO2 98%   BMI 24.22 kg/m²      O2 Device: Room air    Temp (24hrs), Av.8 °F (36.6 °C), Min:97.8 °F (36.6 °C), Max:97.8 °F (36.6 °C)    06/10 1901 -  0700  In: 500 [I.V.:500]  Out: -    No intake/output data recorded. General:  Awake, cooperative, no distress, some obvious speech deficits    Head:  Normocephalic, without obvious abnormality, atraumatic. Eyes:  Conjunctivae/corneas clear, sclera anicteric, PERRL, EOMs intact. Nose: Nares normal. No drainage or sinus tenderness. Throat: Lips, mucosa, and tongue normal.    Neck: Supple, symmetrical, trachea midline, no adenopathy. Lungs:   Clear to auscultation bilaterally. Heart:  Regular rate and rhythm, S1, S2 normal, no murmur, click, rub or gallop. Abdomen: Soft, non-tender. Bowel sounds normal. No masses,  No organomegaly. Extremities: Extremities normal, atraumatic, no cyanosis or edema. Capillary refill normal.   Pulses: 2+ and symmetric all extremities. Skin: Skin color as per ethnicity, turgor normal. No rashes or lesions   Neurologic: CNII-XII intact. No focal motor or sensory deficit.        Labs Reviewed:      Recent Results (from the past 24 hour(s))   CBC WITH AUTOMATED DIFF    Collection Time: 06/10/20  7:40 PM   Result Value Ref Range    WBC 9.3 4.6 - 13.2 K/uL    RBC 4.81 4.70 - 5.50 M/uL    HGB 13.4 13.0 - 16.0 g/dL    HCT 43.5 36.0 - 48.0 %    MCV 90.4 74.0 - 97.0 FL    MCH 27.9 24.0 - 34.0 PG    MCHC 30.8 (L) 31.0 - 37.0 g/dL    RDW 14.6 (H) 11.6 - 14.5 %    PLATELET 964 252 - 675 K/uL    MPV 11.1 9.2 - 11.8 FL    NEUTROPHILS 83 (H) 40 - 73 %    LYMPHOCYTES 10 (L) 21 - 52 %    MONOCYTES 6 3 - 10 %    EOSINOPHILS 1 0 - 5 %    BASOPHILS 0 0 - 2 %    ABS. NEUTROPHILS 7.7 1.8 - 8.0 K/UL    ABS. LYMPHOCYTES 0.9 0.9 - 3.6 K/UL    ABS. MONOCYTES 0.6 0.05 - 1.2 K/UL    ABS. EOSINOPHILS 0.1 0.0 - 0.4 K/UL    ABS. BASOPHILS 0.0 0.0 - 0.1 K/UL    DF AUTOMATED     METABOLIC PANEL, COMPREHENSIVE    Collection Time: 06/10/20  7:40 PM   Result Value Ref Range    Sodium 141 136 - 145 mmol/L    Potassium 4.7 3.5 - 5.5 mmol/L    Chloride 109 100 - 111 mmol/L    CO2 26 21 - 32 mmol/L    Anion gap 6 3.0 - 18 mmol/L    Glucose 203 (H) 74 - 99 mg/dL    BUN 59 (H) 7.0 - 18 MG/DL    Creatinine 2.94 (H) 0.6 - 1.3 MG/DL    BUN/Creatinine ratio 20 12 - 20      GFR est AA 26 (L) >60 ml/min/1.73m2    GFR est non-AA 21 (L) >60 ml/min/1.73m2    Calcium 8.6 8.5 - 10.1 MG/DL    Bilirubin, total 0.8 0.2 - 1.0 MG/DL    ALT (SGPT) 22 16 - 61 U/L    AST (SGOT) 22 10 - 38 U/L    Alk.  phosphatase 108 45 - 117 U/L    Protein, total 7.7 6.4 - 8.2 g/dL    Albumin 3.4 3.4 - 5.0 g/dL    Globulin 4.3 (H) 2.0 - 4.0 g/dL    A-G Ratio 0.8 0.8 - 1.7     LIPASE    Collection Time: 06/10/20  7:40 PM   Result Value Ref Range    Lipase 122 73 - 393 U/L   PROTHROMBIN TIME + INR    Collection Time: 06/10/20  7:40 PM   Result Value Ref Range    Prothrombin time 27.7 (H) 11.5 - 15.2 sec    INR 2.6 (H) 0.8 - 1.2     PTT    Collection Time: 06/10/20  7:40 PM   Result Value Ref Range    aPTT 41.1 (H) 23.0 - 36.4 SEC   EKG, 12 LEAD, INITIAL    Collection Time: 06/10/20  7:55 PM   Result Value Ref Range    Ventricular Rate 74 BPM    Atrial Rate 74 BPM    P-R Interval 166 ms    QRS Duration 96 ms    Q-T Interval 380 ms    QTC Calculation (Bezet) 421 ms    Calculated P Axis 53 degrees    Calculated R Axis 7 degrees    Calculated T Axis 135 degrees    Diagnosis       Normal sinus rhythm  T wave abnormality, consider anterolateral ischemia  Abnormal ECG  Confirmed by Rachele Romero MD, Rudolph Longo ([id number]) on 6/10/2020 8:45:15 PM     TYPE & SCREEN    Collection Time: 06/10/20  7:57 PM   Result Value Ref Range    Crossmatch Expiration 06/13/2020     ABO/Rh(D) A POSITIVE     Antibody screen NEG    OCCULT BLOOD, STOOL    Collection Time: 06/10/20  8:08 PM   Result Value Ref Range    Occult blood, stool Negative NEG         Procedures/imaging: see electronic medical records for all procedures/Xrays and details which were not copied into this note but were reviewed prior to creation of Plan      CC: Augustus Piña MD

## 2020-06-11 NOTE — PROGRESS NOTES
Problem: Mobility Impaired (Adult and Pediatric)  Goal: *Acute Goals and Plan of Care (Insert Text)  Description: Physical Therapy Goals   Initiated 6/11/2020 and to be accomplished within 5-7 day(s)  1. Patient will move from supine <> sit with S in prep for out of bed activity and change of position. 2.  Patient will perform sit<> stand with S with LRAD in prep for transfers/ambulation. 3.  Patient will transfer from bed <> chair with S with LRAD for time up in chair for completion of ADL activity. 4.  Patient will ambulate >100 feet with LRAD/S for improved functional mobility/safe discharge. 5.  Patient will ascend/descend 3-5 stairs with handrail with minimal assistance/contact guard assist for home re-entry as needed. Outcome: Progressing Towards Goal   PHYSICAL THERAPY EVALUATION    Patient: Maverick Akhtar (53 y.o. male)  Date: 6/11/2020  Primary Diagnosis: UGIB (upper gastrointestinal bleed) [K92.2]  АЛЕКСАНДР (acute kidney injury) ([facility] [address] [N17.9]  Procedure(s) (LRB):  ESOPHAGOGASTRODUODENOSCOPY (EGD) (N/A) Day of Surgery   Precautions:  Fall    ASSESSMENT :  Based on the objective data described below, the patient presents with decrease independence w/ bed mobility, transfers, gait, and step negotiation. Pt seen in supine prior to session, nurse present in the room. Pt seen w/ OT for an additional set of skilled hands. Pt reported no pain at this time. Pt primarily speaks Ukrainian but can understand some english. Spoke with spouse, per spouse pt is mod I with QC. Pt has a PMH of CVA with R side residual weakness. Pt demonstrates R shoulder subluxtion and R flexion contracted hand. Pt able to ambulate w/ QC in the room CGA, pt demonstrates a step to gait, decrease zeb, shuffling gait on the R LE. Pt had mild difficulty with turning w/ QC while ambulating. Pt left sitting in upright chair after session, call bell and tray in reach, nurse notified after session.      Patient will benefit from skilled intervention to address the above impairments. Patients rehabilitation potential is considered to be Good  Factors which may influence rehabilitation potential include:   []         None noted  []         Mental ability/status  []         Medical condition  [x]         Home/family situation and support systems  [x]         Safety awareness  []         Pain tolerance/management  []         Other:      PLAN :  Recommendations and Planned Interventions:  [x]           Bed Mobility Training             [x]    Neuromuscular Re-Education  [x]           Transfer Training                   []    Orthotic/Prosthetic Training  [x]           Gait Training                          []    Modalities  [x]           Therapeutic Exercises          []    Edema Management/Control  [x]           Therapeutic Activities            [x]    Patient and Family Training/Education  []           Other (comment):    Frequency/Duration: Patient will be followed by physical therapy 1-2 times per day to address goals. Discharge Recommendations: Rehab vs Home Health pending progress  Further Equipment Recommendations for Discharge: N/A     SUBJECTIVE:   Patient not a lot of communication secondary to language barrier but pleasant.     OBJECTIVE DATA SUMMARY:     Past Medical History:   Diagnosis Date    CAD (coronary artery disease)     Diabetes ([facility]     Hypercholesterolemia     Hypertension     Myocardial infarction ([facility] [address]     S/P coronary artery stent placement 01/18/2009    Stroke [facility])     patient states \"little\"    Stroke ([facility] [address] 4/2011    sugar related (BS >400)     Past Surgical History:   Procedure Laterality Date    CARDIAC SURG PROCEDURE UNLIST  [id number]    angioplasty and stenting    CORONARY ARTERY ANGIOGRAM  1/14/2016    HX CORONARY STENT PLACEMENT  1/18/2009    stents    HX HEART CATHETERIZATION  1/18/2009    and stents    HX HERNIA REPAIR      HX OTHER SURGICAL      Nephrolithotomy    HX PROSTATECTOMY  2003    HX PTCA  1/18/2009 LEFT HEART PERCUTANEOUS  1/14/2016     Barriers to Learning/Limitations: yes;  physical  Compensate with: Verbal Cues and Tactile Cues  Prior Level of Function/Home Situation:   Home Situation  Home Environment: Private residence  One/Two Story Residence: Two story  Living Alone: No  Support Systems: Spouse/Significant Other/Partner  Patient Expects to be Discharged to[de-identified] Unknown  Current DME Used/Available at Home: Cane, quad  Critical Behavior:  Neurologic State: Alert  Orientation Level: Unable to verbalize  Psychosocial  Purposeful Interaction: Yes  Pt Identified Daily Priority: Clinical issues (comment); Communication issues (comment); Social issues (comment)  Caritas Process: Nurture loving kindness;Enable kiarra/hope;Establish trust;Nurture spiritual self;Teaching/learning; Attend basic human needs;Create healing environment;Open life/death unknowns  Caring Interventions: Reassure; Therapeutic modalities  Reassure: Therapeutic listening; Informing; Acceptance; Instilling kiarra and hope;Caring rounds;Story tellings  Therapeutic Modalities: Humor; Intentional therapeutic touch  Strength:    Strength: Generally decreased, functional  Tone & Sensation:   Tone: Normal  Sensation: Intact  Range Of Motion:  AROM: Generally decreased, functional  Functional Mobility:  Bed Mobility:  Supine to Sit: Minimum assistance; Moderate assistance  Scooting: Contact guard assistance  Transfers:  Sit to Stand: Minimum assistance;Assist x2(Bed elevated for assistance. )  Stand to Sit: Minimum assistance;Assist x2  Balance:   Sitting: Intact  Standing: Impaired; With support  Standing - Static: Fair  Standing - Dynamic : Fair(-)  Ambulation/Gait Training:  Distance (ft): 35 Feet (ft)  Assistive Device: Cane, quad;Gait belt  Ambulation - Level of Assistance: Contact guard assistance  Gait Description (WDL): Exceptions to WDL  Gait Abnormalities: Decreased step clearance;Shuffling gait; Step to gait  Base of Support: Narrowed; Shift to left  Stance: Left decreased  Speed/Minerva: Shuffled; Slow  Step Length: Left shortened;Right shortened  Swing Pattern: Left asymmetrical;Right asymmetrical  Pain:  Pain Scale 1: Numeric (0 - 10)  Pain Intensity 1: 0  Activity Tolerance:   Fair  Please refer to the flowsheet for vital signs taken during this treatment. After treatment:   [x]         Patient left in no apparent distress sitting up in chair  []         Patient left in no apparent distress in bed  [x]         Call bell left within reach  [x]         Nursing notified  []         Caregiver present  []         Bed alarm activated    COMMUNICATION/EDUCATION:   [x]         Fall prevention education was provided and the patient/caregiver indicated understanding. [x]         Patient/family have participated as able in goal setting and plan of care. [x]         Patient/family agree to work toward stated goals and plan of care. []         Patient understands intent and goals of therapy, but is neutral about his/her participation. []         Patient is unable to participate in goal setting and plan of care.     Thank you for this referral.  Yulisa Araya, PT   Time Calculation: 20 mins   Eval Complexity: History: HIGH Complexity :3+ comorbidities / personal factors will impact the outcome/ POC Exam:LOW Complexity : 1-2 Standardized tests and measures addressing body structure, function, activity limitation and / or participation in recreation  Presentation: LOW Complexity : Stable, uncomplicated  Clinical Decision Making:Low Complexity ambulate >30ft  Overall Complexity:LOW

## 2020-06-11 NOTE — PROGRESS NOTES
Reason for Admission:   Vomited coffee ground emesis                    RUR Score:    17%                 Plan for utilizing home health:    TBD      PCP: First and Last name:   [facility]   Name of Practice:    Are you a current patient: Yes/No:    Approximate date of last visit:    Can you participate in a virtual visit with your PCP:                     Current Advanced Directive/Advance Care Plan: On file                         Transition of Care Plan:   Home with physician follow up                   Chart reviewed. Per H&P Casey Mayo is a 77 y.o. male with past medical h/o CVA with residual weakness, MI, hypertension, CAD, on Coumadin for a LV mural thrombus who presents to the emergency department c/o  vomiting around 4 AM. Wife reports that his emesis looks like coffee-grounds and she apparently took a picture.  Patient has had a CVA  in the past and is a poor historian due to residual deficits.      Wife reports that he has had multiple episodes of nausea, vomiting and burning and acid reflux type of pain after meals.  She says today was the first time he threw up coffee ground emesis.  Reports his bowel movements have looked normal without BRBPR or black tarry appearance. Patient took meds today after throwing up but has not had anything to eat/drink. \"    CM contacted pt's wife, Rosalba Iniguez ([phone]; [phone]), to discuss transition of care. Pt's wife is his care giver. Pt's son also lives with pt and his wife. Pt's daughter also lives near by and assists. Pt has a 4 prong cane that he uses to ambulate. Pt does have access to a wheel chair and shower bench available if needed. Pt's wife has indicated he was not receiving any HH services prior to admission. Pt's wife does not feel HH is needed. Anticipate pt will transition home with physician follow up through [facility] with in the next 24-48 hours. Pt's wife will transport pt home upon discharge.       Care Management Interventions  Mode of Transport at Discharge:  Other (see comment)(Family)  Transition of Care Consult (CM Consult): Discharge Planning  Health Maintenance Reviewed: Yes  Physical Therapy Consult: Yes  Occupational Therapy Consult: Yes  Speech Therapy Consult: Yes  Current Support Network: Lives with Spouse  Confirm Follow Up Transport: Family  The Plan for Transition of Care is Related to the Following Treatment Goals : Home with physician follow up   Discharge Location  Discharge Placement: Home with family assistance

## 2020-06-11 NOTE — PROGRESS NOTES
Pharmacy Dosing Services:   Warfarin    Consult for Warfarin Dosing by Pharmacy by Dr. Marjan Vallejo provided for this 77 y.o.  male , for indication of Thromboembolism. Day of Therapy:  Home Medication ( 6 mg T, Th, Christine   3 mg M, W, F, Sa )    Dose to achieve an INR goal of 2-3 ( Verified with Dr. Wilbur Barksdale )    Order entered for  Warfarin  6 (mg) ordered to be given today at 18:00. LABS    PT/INR Lab Results   Component Value Date/Time    INR 2.6 (H) 06/10/2020 07:40 PM      Platelets Lab Results   Component Value Date/Time    PLATELET 826 43/59/2333 03:07 AM      H/H     Lab Results   Component Value Date/Time    HGB 10.3 (L) 06/11/2020 07:15 AM        Warfarin Administrations (last 168 hours)     None          Pharmacy to follow daily and will provide subsequent Warfarin dosing based on clinical status.   Dionna Chun, [facility]  Contact information  [phone]

## 2020-06-11 NOTE — PROGRESS NOTES
Hospitalist Progress Note-critical care note     Patient: Nazia Louis MRN: [id number]  CSN: [id number]    YOB: 1945  Age: 77 y.o. Sex: male    DOA: 6/10/2020 LOS:  LOS: 1 day            Chief complaint: Erosive esophagitis, АЛЕКСАНДР on CKD DM hypertension    Assessment/Plan         Hospital Problems  Date Reviewed: 11/24/2016          Codes Class Noted POA    Erosive esophagitis ICD-10-CM: K22.10  ICD-9-CM: 530.19  6/11/2020 Unknown        Acute renal failure superimposed on stage 3 chronic kidney disease ([facility] ICD-10-CM: N17.9, N18.3  ICD-9-CM: 584.9, 585.3  6/11/2020 Unknown        * (Principal) UGIB (upper gastrointestinal bleed) ICD-10-CM: K92.2  ICD-9-CM: 578.9  6/10/2020 Unknown        LV (left ventricular) mural thrombus ICD-10-CM: I51.3  ICD-9-CM: 410.90  11/26/2016 Yes        DM (diabetes mellitus) ([facility] ICD-10-CM: E11.9  ICD-9-CM: 250.00  1/6/2016 Yes        Moderate hypertension ICD-10-CM: I10  ICD-9-CM: 401.9  Unknown Yes    Overview Signed 12/6/2010  1:52 PM by Leo Pineda T     Controlled with Suzanne                     Upper GI bleed -upper EGD done no acute bleeding, erosive esophagitis noted  GI is okay restart warfarin and aspirin  PPI for whole life     АЛЕКСАНДР on CKD 3  Mild improving  Continue IV hydration  Monitor renal function  Avoid IV contrast and NSAIDs     CAD -stable  Resume home meds   Monitor BP      DM -  SSI, FSBG qac and qhs,     HTN   Restart metoprolol     LV thrombosis  Continue warfarin    CVA with residual deficits -  Supportive care  Aspiration precaution PT OT. Speech evaluation    Subjective: No abdominal pain. No chest pain    Call her wife,All questions have been answered. 55 total min's spent on patient care including >50% on counseling/coordinating care. Discussed the above assessments.  also discussed labs, medications and hospital course    Speech evaluation PT OT, start dysphagia diet, continue IV hydration, restart warfarin, DC tomorrow if renal function got improved. Disposition :1-2 days   Review of systems:    General: No fevers or chills. Cardiovascular: No chest pain or pressure. No palpitations. Pulmonary: No shortness of breath. Gastrointestinal: No nausea, vomiting. Vital signs/Intake and Output:  Visit Vitals  /67 (BP 1 Location: Left arm, BP Patient Position: At rest;Supine)   Pulse 73   Temp 98.1 °F (36.7 °C)   Resp 18   Ht 6' 0.01\" (1.829 m)   Wt 90.3 kg (199 lb)   SpO2 94%   BMI 26.98 kg/m²     Current Shift:  No intake/output data recorded. Last three shifts:  06/09 1901 - 06/11 0700  In: 500 [I.V.:500]  Out: 1 [Urine:1]    Physical Exam:  General: WD, WN. Alert, cooperative, no acute distress    HEENT: NC, Atraumatic. PERRLA, anicteric sclerae. Lungs: CTA Bilaterally. No Wheezing/Rhonchi/Rales. Heart:  Regular  rhythm,  No murmur, No Rubs, No Gallops  Abdomen: Soft, Non distended, Non tender. +Bowel sounds,   Extremities: No c/c/e  Psych:   Not anxious or agitated. Neurologic:  No acute neurological deficit except right side weakness          Labs: Results:       Chemistry Recent Labs     06/11/20  0307 06/10/20  1940   * 203*    141   K 4.0 4.7   * 109   CO2 24 26   BUN 55* 59*   CREA 2.40* 2.94*   CA 7.6* 8.6   AGAP 6 6   BUCR 23* 20   AP 76 108   TP 5.8* 7.7   ALB 2.6* 3.4   GLOB 3.2 4.3*   AGRAT 0.8 0.8      CBC w/Diff Recent Labs     06/11/20  1330 06/11/20  0715 06/11/20  0307 06/10/20  1940   WBC  --   --  7.7 9.3   RBC  --   --  3.91* 4.81   HGB 11.4* 10.3* 10.8* 13.4   HCT 36.9 33.8* 35.5* 43.5   PLT  --   --  142 178   GRANS  --   --  73 83*   LYMPH  --   --  18* 10*   EOS  --   --  1 1      Cardiac Enzymes No results for input(s): CPK, CKND1, ALEM in the last 72 hours.     No lab exists for component: CKRMB, TROIP   Coagulation Recent Labs     06/10/20  1940   PTP 27.7*   INR 2.6*   APTT 41.1*       Lipid Panel No results found for: CHOL, CHOLPOCT, CHOLX, CHLST, CHOLV, 805339, HDL, HDLP, LDL, LDLC, DLDLP, 412812, VLDLC, VLDL, TGLX, TRIGL, TRIGP, TGLPOCT, CHHD, CHHDX   BNP No results for input(s): BNPP in the last 72 hours. Liver Enzymes Recent Labs     06/11/20  0307   TP 5.8*   ALB 2.6*   AP 76      Thyroid Studies No results found for: T4, T3U, TSH, TSHEXT, TSHEXT     Procedures/imaging: see electronic medical records for all procedures/Xrays and details which were not copied into this note but were reviewed prior to creation of Plan    No results found.     Ivet Staples MD

## 2020-06-11 NOTE — PROGRESS NOTES
Problem: Self Care Deficits Care Plan (Adult)  Goal: *Acute Goals and Plan of Care (Insert Text)  Description: Initial OT STGs (6/11/2020) Within 7 days:    1. Patient will perform toilet transfer with supervision in preparation for bowel and bladder management. 2. Patient will perform bowel and bladder management with supervision for increased independence with ADLs. 3. Patient will perform UB dressing with supervision while utilizing fatuma-techniques for increased independence with ADLs. 4. Patient will perform LB dressing with supervision while utilizing fatuma-techniques for increased independence with ADLs. 5. Patient will perform UE exercises with moderate assist for 5 minutes to increase independence with ADLs. 7. Patient will utilize energy conservation techniques with 3 verbal cue(s) for increased independence with ADLs. Outcome: Progressing Towards Goal   OCCUPATIONAL THERAPY EVALUATION    Patient: Josephine Rapp (41 y.o. male)  Date: 6/11/2020  Primary Diagnosis: UGIB (upper gastrointestinal bleed) [K92.2]  АЛЕКСАНДР (acute kidney injury) ([facility] [N17.9]  Procedure(s) (LRB):  ESOPHAGOGASTRODUODENOSCOPY (EGD) (N/A) Day of Surgery   Precautions:  Fall  PLOF: Per pt's wife Asher Bond, pt was modified independent with ADL's, transfers, and mobility using quad cane. Pt required extra time to complete all tasks. ASSESSMENT :  Based on the objective data described below, the patient presents with decreased strength, balance, safety, and activity tolerance which limits his independence with ADL's and transfers. Pt received in supine. Pt rated his pain level a 0/10. OT and PT did a co-eval with pt to ensure pt's safety with OOB activity. Pt is [place]ian speaking but he understood some [place]. Pt's PLOF was obtained from his wife via the phone. Pt did supine to sit to left side of the bed with Mod-Min A.  Pt has a 1 finger subluxation present at R shoulder and wrist flexion contracture present on RUE from previous CVA. Pt required CGA to scoot to the EOB. While sitting on the EOB, pt donned socks with Min A and extra time. Pt did sit to stand from EOB with Mod-Min A and vc's for hand placement/safety. Pt ambulated to doorway and back with quad cane and Min A for balance/safety. Pt agreeable to sit in chair at bedside at the end of the session. Pt instructed to call for assistance to get back to bed. Pt verbalized understanding. RN notified. Pt will benefit from skilled OT services to increase independence and safety with ADL's and transfers. Education: role of OT, OT POC and goals, safety with OOB activity, hand placement with transfers, quad cane use    Patient will benefit from skilled intervention to address the above impairments. Patient's rehabilitation potential is considered to be Good  Factors which may influence rehabilitation potential include:   []             None noted  []             Mental ability/status  [x]             Medical condition  []             Home/family situation and support systems  [x]             Safety awareness  []             Pain tolerance/management  []             Other:      PLAN :  Recommendations and Planned Interventions:   [x]               Self Care Training                  [x]      Therapeutic Activities  [x]               Functional Mobility Training   []      Cognitive Retraining  [x]               Therapeutic Exercises           [x]      Endurance Activities  [x]               Balance Training                    []      Neuromuscular Re-Education  []               Visual/Perceptual Training     [x]      Home Safety Training  [x]               Patient Education                   [x]      Family Training/Education  []               Other (comment):    Frequency/Duration: Patient will be followed by occupational therapy 1-2 times per day/4-7 days per week to address goals.   Discharge Recommendations: Home Health  Further Equipment Recommendations for Discharge: N/A SUBJECTIVE:   Patient stated okay.     OBJECTIVE DATA SUMMARY:     Past Medical History:   Diagnosis Date    CAD (coronary artery disease)     Diabetes ([facility]     Hypercholesterolemia     Hypertension     Myocardial infarction ([facility]     S/P coronary artery stent placement 01/18/2009    Stroke [facility])     patient states \"little\"    Stroke ([facility] 4/2011    sugar related (BS >400)     Past Surgical History:   Procedure Laterality Date    CARDIAC SURG PROCEDURE UNLIST  [id number]    angioplasty and stenting    CORONARY ARTERY ANGIOGRAM  1/14/2016    HX CORONARY STENT PLACEMENT  1/18/2009    stents    HX HEART CATHETERIZATION  1/18/2009    and stents    HX HERNIA REPAIR      HX OTHER SURGICAL      Nephrolithotomy    HX PROSTATECTOMY  2003    HX PTCA  1/18/2009    LEFT HEART PERCUTANEOUS  1/14/2016     Barriers to Learning/Limitations: None  Compensate with: visual, verbal, tactile, kinesthetic cues/model    Home Situation:   Home Situation  Home Environment: Private residence  # Steps to Enter: 1(threshold)  One/Two Story Residence: Two story  # of Interior Steps: 13  Living Alone: No  Support Systems: Spouse/Significant Other/Partner  Patient Expects to be Discharged to[de-identified] Unknown  Current DME Used/Available at Home: Cane, quad  Tub or Shower Type: Shower  []  Right hand dominant   [x]  Left hand dominant post CVA    Cognitive/Behavioral Status:  Neurologic State: Alert  Orientation Level: Unable to verbalize (Cameroonian speaking)  Cognition: Follows commands  Safety/Judgement: Fall prevention    Coordination: BUE  Coordination: Within functional limits  Fine Motor Skills-Upper: Left Intact; Right Impaired    Gross Motor Skills-Upper: Left Intact; Right Impaired    Balance:  Sitting: Intact  Standing: Impaired; With support  Standing - Static: Fair  Standing - Dynamic : Fair(-)    Strength: BUE  Strength: Generally decreased, functional (RUE flaccid)     Tone & Sensation: BUE  Tone: Normal  Sensation: Intact     Range of Motion: BUE  AROM: Generally decreased, functional (RUE flaccid)     Functional Mobility and Transfers for ADLs:  Bed Mobility:  Supine to Sit: Minimum assistance; Moderate assistance  Scooting: Contact guard assistance  Transfers:  Sit to Stand: Minimum assistance;Assist x2(Bed elevated for assistance. )  Bed to Chair: Minimum assistance;Assist x2   Assistive Device: Cane, quad;Gait belt      ADL Assessment:   Feeding: Setup  Oral Facial Hygiene/Grooming: Setup  Bathing: Minimum assistance  Upper Body Dressing: Minimum assistance  Lower Body Dressing: Minimum assistance  Toileting: Minimum assistance     ADL Intervention:   Lower Body Dressing Assistance  Dressing Assistance: Minimum assistance  Socks: Minimum assistance  Leg Crossed Method Used: Yes  Position Performed: Seated edge of bed  Cues: Don;Verbal cues provided    Cognitive Retraining  Safety/Judgement: Fall prevention    Pain:  Pain level pre-treatment: 0/10   Pain level post-treatment: 0/10   Pain Intervention(s): Medication provided by Nursing (see MAR); Rest, Ice, Repositioning  Response to intervention: Nurse notified, See doc flow sheet    Activity Tolerance:   Poor to fair. Patient able to stand 1 minute(s). Patient able to complete ADLs with rest breaks. Patient unsteady. Please refer to the flowsheet for vital signs taken during this treatment. After treatment:   [x] Patient left in no apparent distress sitting up in chair  [] Patient left in no apparent distress in bed  [x] Call bell left within reach  [x] Nursing notified  [] Caregiver present  [] Bed alarm activated    COMMUNICATION/EDUCATION:   [x] Role of Occupational Therapy in the acute care setting  [x] Home safety education was provided and the patient/caregiver indicated understanding. [x] Patient/family have participated as able in goal setting and plan of care. [x] Patient/family agree to work toward stated goals and plan of care.   [] Patient understands intent and goals of therapy, but is neutral about his/her participation. [] Patient is unable to participate in goal setting and plan of care. Thank you for this referral.  Aly Cooley OTR/L MOT  Time Calculation: 20 mins    Eval Complexity: History: MEDIUM Complexity : Expanded review of history including physical, cognitive and psychosocial  history ; Examination: MEDIUM Complexity : 3-5 performance deficits relating to physical, cognitive , or psychosocial skils that result in activity limitations and / or participation restrictions; Decision Making:MEDIUM Complexity : Patient may present with comorbidities that affect occupational performnce.  Miniml to moderate modification of tasks or assistance (eg, physical or verbal ) with assesment(s) is necessary to enable patient to complete evaluation

## 2020-06-11 NOTE — PROGRESS NOTES
1900 Bedside and Verbal shift change report given to MARTIR Lion (oncoming nurse) by Malika Cartwright. Katie Dorman RN (offgoing nurse). Report included the following information SBAR, Kardex, MAR, Accordion and Recent Results. Pt had an uneventful night. 0700 Bedside and Verbal shift change report given to  BARAK Luis RN (oncoming nurse) by Kan Ortiz. Cait Lion RN (offgoing nurse). Report included the following information SBAR, Kardex, MAR, Accordion and Recent Results.

## 2020-06-12 VITALS
BODY MASS INDEX: 24.75 KG/M2 | RESPIRATION RATE: 18 BRPM | WEIGHT: 182.7 LBS | DIASTOLIC BLOOD PRESSURE: 58 MMHG | HEART RATE: 65 BPM | TEMPERATURE: 97.4 F | SYSTOLIC BLOOD PRESSURE: 128 MMHG | HEIGHT: 72 IN | OXYGEN SATURATION: 97 %

## 2020-06-12 LAB
ANION GAP SERPL CALC-SCNC: 4 MMOL/L (ref 3–18)
BUN SERPL-MCNC: 43 MG/DL (ref 7–18)
BUN/CREAT SERPL: 22 (ref 12–20)
CALCIUM SERPL-MCNC: 7.2 MG/DL (ref 8.5–10.1)
CHLORIDE SERPL-SCNC: 117 MMOL/L (ref 100–111)
CO2 SERPL-SCNC: 22 MMOL/L (ref 21–32)
CREAT SERPL-MCNC: 1.99 MG/DL (ref 0.6–1.3)
GLUCOSE BLD STRIP.AUTO-MCNC: 140 MG/DL (ref 70–110)
GLUCOSE BLD STRIP.AUTO-MCNC: 90 MG/DL (ref 70–110)
GLUCOSE SERPL-MCNC: 108 MG/DL (ref 74–99)
HCT VFR BLD AUTO: 32.5 % (ref 36–48)
HCT VFR BLD AUTO: 32.8 % (ref 36–48)
HGB BLD-MCNC: 10.1 G/DL (ref 13–16)
HGB BLD-MCNC: 10.1 G/DL (ref 13–16)
INR PPP: 2.7 (ref 0.8–1.2)
POTASSIUM SERPL-SCNC: 4.4 MMOL/L (ref 3.5–5.5)
PROTHROMBIN TIME: 28.5 SEC (ref 11.5–15.2)
SODIUM SERPL-SCNC: 143 MMOL/L (ref 136–145)

## 2020-06-12 PROCEDURE — 82962 GLUCOSE BLOOD TEST: CPT

## 2020-06-12 PROCEDURE — 36415 COLL VENOUS BLD VENIPUNCTURE: CPT

## 2020-06-12 PROCEDURE — 99218 HC RM OBSERVATION: CPT

## 2020-06-12 PROCEDURE — 80048 BASIC METABOLIC PNL TOTAL CA: CPT

## 2020-06-12 PROCEDURE — 74011250637 HC RX REV CODE- 250/637: Performed by: FAMILY MEDICINE

## 2020-06-12 PROCEDURE — 74011000250 HC RX REV CODE- 250: Performed by: FAMILY MEDICINE

## 2020-06-12 PROCEDURE — 85018 HEMOGLOBIN: CPT

## 2020-06-12 PROCEDURE — 74011250636 HC RX REV CODE- 250/636: Performed by: FAMILY MEDICINE

## 2020-06-12 PROCEDURE — 85610 PROTHROMBIN TIME: CPT

## 2020-06-12 PROCEDURE — 74011250637 HC RX REV CODE- 250/637: Performed by: HOSPITALIST

## 2020-06-12 PROCEDURE — C9113 INJ PANTOPRAZOLE SODIUM, VIA: HCPCS | Performed by: FAMILY MEDICINE

## 2020-06-12 PROCEDURE — 74011250636 HC RX REV CODE- 250/636: Performed by: HOSPITALIST

## 2020-06-12 RX ORDER — PHENOL/SODIUM PHENOLATE
20 AEROSOL, SPRAY (ML) MUCOUS MEMBRANE 2 TIMES DAILY
Qty: 60 TAB | Refills: 0 | Status: SHIPPED | OUTPATIENT
Start: 2020-06-12

## 2020-06-12 RX ORDER — WARFARIN 3 MG/1
3 TABLET ORAL ONCE
Status: DISCONTINUED | OUTPATIENT
Start: 2020-06-12 | End: 2020-06-12 | Stop reason: HOSPADM

## 2020-06-12 RX ADMIN — SODIUM CHLORIDE 40 MG: 9 INJECTION, SOLUTION INTRAMUSCULAR; INTRAVENOUS; SUBCUTANEOUS at 09:10

## 2020-06-12 RX ADMIN — ASPIRIN 81 MG: 81 TABLET, COATED ORAL at 09:10

## 2020-06-12 RX ADMIN — METOPROLOL TARTRATE 25 MG: 25 TABLET, FILM COATED ORAL at 09:09

## 2020-06-12 RX ADMIN — Medication 10 ML: at 06:00

## 2020-06-12 RX ADMIN — ISOSORBIDE MONONITRATE 30 MG: 30 TABLET, EXTENDED RELEASE ORAL at 09:09

## 2020-06-12 RX ADMIN — ATORVASTATIN CALCIUM 20 MG: 20 TABLET, FILM COATED ORAL at 09:10

## 2020-06-12 RX ADMIN — VITAMIN D, TAB 1000IU (100/BT) 2 TABLET: 25 TAB at 09:09

## 2020-06-12 NOTE — DISCHARGE SUMMARY
Discharge Summary    Patient: Anjum Sims MRN: [id number]  CSN: [id number]    YOB: 1945  Age: 77 y.o. Sex: male    DOA: 6/10/2020 LOS:  LOS: 1 day   Discharge Date: 6/12/2020     Primary Care Provider:  Zunilda Kat MD    Admission Diagnoses: UGIB (upper gastrointestinal bleed) [K92.2]  АЛЕКСАНДР (acute kidney injury) [facility]) [N17.9]    Discharge Diagnoses:    Hospital Problems  Date Reviewed: 11/24/2016          Codes Class Noted POA    Erosive esophagitis ICD-10-CM: K22.10  ICD-9-CM: 530.19  6/11/2020 Unknown        Acute renal failure superimposed on stage 3 chronic kidney disease ([facility] ICD-10-CM: N17.9, N18.3  ICD-9-CM: 584.9, 585.3  6/11/2020 Unknown        * (Principal) UGIB (upper gastrointestinal bleed) ICD-10-CM: K92.2  ICD-9-CM: 578.9  6/10/2020 Unknown        LV (left ventricular) mural thrombus ICD-10-CM: I51.3  ICD-9-CM: 410.90  11/26/2016 Yes        DM (diabetes mellitus) ([facility] ICD-10-CM: E11.9  ICD-9-CM: 250.00  1/6/2016 Yes        Moderate hypertension ICD-10-CM: I10  ICD-9-CM: 401.9  Unknown Yes    Overview Signed 12/6/2010  1:52 PM by Prashant Rogers     Controlled with Suzanne                   Discharge Condition: stable     Discharge Medications:     Discharge Medication List as of 6/12/2020 10:12 AM      START taking these medications    Details   Omeprazole delayed release (PRILOSEC D/R) 20 mg tablet Take 1 Tab by mouth two (2) times a day., Print, Disp-60 Tab, R-0         CONTINUE these medications which have NOT CHANGED    Details   !! warfarin (COUMADIN) 6 mg tablet Take 6 mg by mouth daily. T,TH,SUN, Historical Med      hydrALAZINE (APRESOLINE) 25 mg tablet Take 25 mg by mouth two (2) times a day., Historical Med      cholecalciferol, vitamin D3, (Vitamin D3) 50 mcg (2,000 unit) tab Take 5,000 Units by mouth daily. , Historical Med      coenzyme Q-10 (Co Q-10) 200 mg capsule Take 400 mg by mouth daily. , Historical Med      cinnamon bark (Cinnamon) 500 mg cap Take 2,000 mg by mouth daily. , Historical Med      aspirin delayed-release 81 mg tablet Take 1 Tab by mouth daily. , Normal, Disp-30 Tab, R-0      !! warfarin (COUMADIN) 3 mg tablet Take 2 Tabs by mouth daily. , Print, Disp-60 Tab, R-0      glipiZIDE (GLUCOTROL) 5 mg tablet Take 2.5 mg by mouth daily. With breakfast, Historical Med      spironolactone (ALDACTONE) 25 mg tablet Take 1 Tab by mouth daily. , Print, Disp-30 Tab, R-0      metoprolol (LOPRESSOR) 25 mg tablet Take 1 Tab by mouth every twelve (12) hours. , Print, Disp-60 Tab, R-0      isosorbide mononitrate ER (IMDUR) 30 mg tablet Take 1 Tab by mouth daily. , Print, Disp-30 Tab, R-0      atorvastatin (LIPITOR) 20 mg tablet Take 20 mg by mouth daily. , Historical Med      krill-omega-3-dha-epa-lipids 076-05-51-70 mg cap Take  by mouth., Historical Med       !! - Potential duplicate medications found. Please discuss with provider. STOP taking these medications       furosemide (LASIX) 40 mg tablet Comments:   Reason for Stopping:               Procedures : upper EGD     Consults: Gastroenterology      PHYSICAL EXAM   Visit Vitals  /58   Pulse 65   Temp 97.4 °F (36.3 °C)   Resp 18   Ht 6' 0.01\" (1.829 m)   Wt 82.9 kg (182 lb 11.2 oz)   SpO2 97%   BMI 24.77 kg/m²     General: Awake, cooperative, no acute distress    HEENT: NC, Atraumatic. PERRLA, EOMI. Anicteric sclerae. Lungs:  CTA Bilaterally. No Wheezing/Rhonchi/Rales. Heart:  Regular  rhythm,  No murmur, No Rubs, No Gallops  Abdomen: Soft, Non distended, Non tender. +Bowel sounds,   Extremities: No c/c/e  Psych:   Not anxious or agitated.   Neurologic:  No acute neurological deficits except rt side weakness                                  Admission HPI :   Param Garcia is a 77 y.o. Jackquline Wilton past medical h/o CVA with residual weakness, MI, hypertension, CAD, on Coumadin for a LV mural thrombus who presents to the emergency department c/o  vomiting around 4 AM. Wife reports that his emesis looks like coffee-grounds and she apparently took a picture. Pepe Martinez has had a CVA  in the past and is a poor historian due to residual deficits.      Wife reports that he has had multiple episodes of nausea, vomiting and burning and acid reflux type of pain after meals.  She says today was the first time he threw up coffee ground emesis.  Reports his bowel movements have looked normal without BRBPR or black tarry appearance. Patient took meds today after throwing up but has not had anything to [address] [place] [address] :   Nadeem Day is a 77 y.o. male with past medical h/o CVA with residual weakness, MI, hypertension, CAD, on Coumadin for a LV mural thrombus  was admitted due to possible GI bleeding and ЛАЕКСАНДР on CKD. Puja Georges Since he was admitted warfarin was hold, GI did upper EGD, no acute bleeding noted. But indicated erosive esophagitis. GI recommended:    resume coumadin and ASA as before. Also resume antireflux and diabetic diet. Avoid all  NSAID's. Make a FU appointment at the office. Start taking Omeprazole 20 mg twice daily for 3 months then once daily for life consider repeating EGD in 6 months. Avoid eating late at night. Consider getting a screening colonoscopy as an outpatient if not done before! His H&H has been  stable after restarting warfarin. He also received IV hydration for acute on chronic renal failure. On discharge the creatinine level was back to his baseline. He also received physical therapy. He remained hemodynamically stable during his stay. Discharge planning discussed with patient, pt agrees  with the plan and no questions and concerns at this point.       was used during his stay due to he is Gambian speaker    Activity: Activity as tolerated    Diet: Cardiac Diet    Follow-up: PCP and GI     Disposition: home with home health     Minutes spent on discharge: 45 min       Labs: Results:       Chemistry Recent Labs     06/12/20  0115 06/11/20  0307 06/10/20  1940   * 100* 203*    143 141   K 4.4 4.0 4.7   * 113* 109   CO2 22 24 26   BUN 43* 55* 59*   CREA 1.99* 2.40* 2.94*   CA 7.2* 7.6* 8.6   AGAP 4 6 6   BUCR 22* 23* 20   AP  --  76 108   TP  --  5.8* 7.7   ALB  --  2.6* 3.4   GLOB  --  3.2 4.3*   AGRAT  --  0.8 0.8      CBC w/Diff Recent Labs     06/12/20  0656 06/12/20  0115 06/11/20  1907  06/11/20  0307 06/10/20  1940   WBC  --   --   --   --  7.7 9.3   RBC  --   --   --   --  3.91* 4.81   HGB 10.1* 10.1* 10.1*   < > 10.8* 13.4   HCT 32.5* 32.8* 32.9*   < > 35.5* 43.5   PLT  --   --   --   --  142 178   GRANS  --   --   --   --  73 83*   LYMPH  --   --   --   --  18* 10*   EOS  --   --   --   --  1 1    < > = values in this interval not displayed. Cardiac Enzymes No results for input(s): CPK, CKND1, ALEM in the last 72 hours. No lab exists for component: CKRMB, TROIP   Coagulation Recent Labs     06/12/20  0850 06/10/20  1940   PTP 28.5* 27.7*   INR 2.7* 2.6*   APTT  --  41.1*       Lipid Panel No results found for: CHOL, CHOLPOCT, CHOLX, CHLST, CHOLV, 632132, HDL, HDLP, LDL, LDLC, DLDLP, 266095, VLDLC, VLDL, TGLX, TRIGL, TRIGP, TGLPOCT, CHHD, CHHDX   BNP No results for input(s): BNPP in the last 72 hours. Liver Enzymes Recent Labs     06/11/20  0307   TP 5.8*   ALB 2.6*   AP 76      Thyroid Studies No results found for: T4, T3U, TSH, TSHEXT         Significant Diagnostic Studies: No results found.           [facility]     CC: José Gillette MD

## 2020-06-12 NOTE — PROGRESS NOTES
EMR ENTERED AND REVIEWED BY THIS RN FOR THE PURPOSE OF CHART REVIEW IN THE COURSE OF PERFORMING RESPONSIBILITIES RELATED TO CHARGE NURSE DUTIES.

## 2020-06-12 NOTE — PROGRESS NOTES
Discharge instructions reviewed with the patient's wife due to patient's language barrier and AMS. Patient's wife verbalized understanding. All questions answered. IV discontinued, no redness, swelling or pain noted. Patient awaiting wife for transportation home, with an ETA of 30 mins. Patient armband removed and shredded.

## 2020-06-12 NOTE — PROGRESS NOTES
Problem: Falls - Risk of  Goal: *Absence of Falls  Description: Document Kayley Triana Fall Risk and appropriate interventions in the flowsheet. Outcome: Progressing Towards Goal  Note: Fall Risk Interventions:            Medication Interventions: Teach patient to arise slowly, Patient to call before getting OOB    Elimination Interventions: Call light in reach, Patient to call for help with toileting needs, Toileting schedule/hourly rounds              Problem: Patient Education: Go to Patient Education Activity  Goal: Patient/Family Education  Outcome: Progressing Towards Goal     Problem: Pressure Injury - Risk of  Goal: *Prevention of pressure injury  Description: Document Min Scale and appropriate interventions in the flowsheet.   Outcome: Progressing Towards Goal  Note: Pressure Injury Interventions:       Moisture Interventions: Limit adult briefs, Check for incontinence Q2 hours and as needed, Absorbent underpads, Minimize layers, Offer toileting Q_hr    Activity Interventions: PT/OT evaluation, Pressure redistribution bed/mattress(bed type), Increase time out of bed    Mobility Interventions: Pressure redistribution bed/mattress (bed type), PT/OT evaluation, HOB 30 degrees or less, Assess need for specialty bed    Nutrition Interventions: Document food/fluid/supplement intake                     Problem: Patient Education: Go to Patient Education Activity  Goal: Patient/Family Education  Outcome: Progressing Towards Goal     Problem: Patient Education: Go to Patient Education Activity  Goal: Patient/Family Education  Outcome: Progressing Towards Goal     Problem: Patient Education: Go to Patient Education Activity  Goal: Patient/Family Education  Outcome: Progressing Towards Goal

## 2020-06-12 NOTE — DISCHARGE INSTRUCTIONS
Patient Education        Gastrointestinal Bleeding: Care Instructions  Your Care Instructions     The digestive or gastrointestinal tract goes from the mouth to the anus. It is often called the GI tract. Bleeding can happen anywhere in the GI tract. It may be caused by an ulcer, an infection, or cancer. It may also be caused by medicines such as aspirin or ibuprofen. Light bleeding may not cause any symptoms at first. But if you continue to bleed for a while, you may feel very weak or tired. Sudden, heavy bleeding means you need to see a doctor right away. This kind of bleeding can be very dangerous. But it can usually be cured or controlled. The doctor may do some tests to find the cause of your bleeding. Follow-up care is a key part of your treatment and safety. Be sure to make and go to all appointments, and call your doctor if you are having problems. It's also a good idea to know your test results and keep a list of the medicines you take. How can you care for yourself at home? · Be safe with medicines. Take your medicines exactly as prescribed. Call your doctor if you think you are having a problem with your medicine. You will get more details on the specific medicines your doctor prescribes. · Do not take aspirin or other anti-inflammatory medicines, such as naproxen (Aleve) or ibuprofen (Advil, Motrin), without talking to your doctor first. Ask your doctor if it is okay to use acetaminophen (Tylenol). · Do not drink alcohol. · The bleeding may make you lose iron. So it's important to eat foods that have a lot of iron. These include red meat, shellfish, poultry, and eggs. They also include beans, raisins, whole-grain breads, and leafy green vegetables. If you want help planning meals, you can make an appointment with a dietitian. When should you call for help? [id number] anytime you think you may need emergency care. For example, call if:  · You have sudden, severe belly pain.   · You vomit blood or what looks like coffee grounds. · You passed out (lost consciousness). · Your stools are maroon or very bloody. Call your doctor now or seek immediate medical care if:  · You are dizzy or lightheaded, or you feel like you may faint. · Your stools are black and look like tar, or they have streaks of blood. · You have belly pain. · You vomit or have nausea. · You have trouble swallowing, or it hurts when you swallow. Watch closely for changes in your health, and be sure to contact your doctor if:  · You do not get better as expected. Where can you learn more? Go to http://ry-blayne.info/  Enter F981 in the search box to learn more about \"Gastrointestinal Bleeding: Care Instructions. \"  Current as of: June 26, 2019               Content Version: 12.5  © 9912-4537 [employer]. Care instructions adapted under license by [employer] (which disclaims liability or warranty for this information). If you have questions about a medical condition or this instruction, always ask your healthcare professional. [address] any warranty or liability for your use of this information.

## 2020-06-12 NOTE — PROGRESS NOTES
Pharmacy Dosing Services: Warfarin    Consult for Warfarin Dosing by Pharmacy by Dr. Tata Reynaga provided for this 77 y.o.  male , for indication of Venous Thrombosis. Day of Therapy 2  Dose to achieve an INR goal of 2-3    Order entered for Warfarin 3 mg ordered to be given today at 18:00. PT/INR Lab Results   Component Value Date/Time    INR 2.7 (H) 06/12/2020 08:50 AM      Platelets Lab Results   Component Value Date/Time    PLATELET 881 56/74/0419 03:07 AM      H/H     Lab Results   Component Value Date/Time    HGB 10.1 (L) 06/12/2020 06:56 AM        Warfarin Administrations (last 168 hours)       Date/Time Action Medication Dose    06/11/20 1809 Given    warfarin (COUMADIN) tablet 6 mg 6 mg              Pharmacy to follow daily and will provide subsequent Warfarin dosing based on clinical status.   TIRENEY Farahso Raghu [address] information [phone]

## 2022-03-18 PROBLEM — K22.10 EROSIVE ESOPHAGITIS: Status: ACTIVE | Noted: 2020-06-11

## 2022-03-20 PROBLEM — K92.2 UGIB (UPPER GASTROINTESTINAL BLEED): Status: ACTIVE | Noted: 2020-06-10

## 2022-03-20 PROBLEM — N17.9 ACUTE RENAL FAILURE SUPERIMPOSED ON STAGE 3 CHRONIC KIDNEY DISEASE (HCC): Status: ACTIVE | Noted: 2020-06-11

## 2022-03-20 PROBLEM — N18.30 ACUTE RENAL FAILURE SUPERIMPOSED ON STAGE 3 CHRONIC KIDNEY DISEASE (HCC): Status: ACTIVE | Noted: 2020-06-11

## 2022-10-19 ENCOUNTER — TRANSCRIBE ORDER (OUTPATIENT)
Dept: SCHEDULING | Age: 77
End: 2022-10-19

## 2022-10-19 DIAGNOSIS — I73.9 PERIPHERAL VASCULAR DISEASE (HCC): Primary | ICD-10-CM

## 2022-10-24 ENCOUNTER — HOSPITAL ENCOUNTER (OUTPATIENT)
Dept: VASCULAR SURGERY | Age: 77
Discharge: HOME | End: 2022-10-24
Payer: MEDICARE

## 2022-10-24 DIAGNOSIS — I73.9 PERIPHERAL VASCULAR DISEASE (HCC): ICD-10-CM

## 2022-10-24 PROCEDURE — 93925 LOWER EXTREMITY STUDY: CPT

## 2022-10-25 LAB
LEFT CFA DIST SYS PSV: 108.2 CM/S
LEFT DIST ATA VELOCITY: 64.2 CM/S
LEFT DIST PTA PSV: 55.2 CM/S
LEFT POPLITEAL DIST SYS PSV: 81.8 CM/S
LEFT PROX PFA A PSV: 69.9 CM/S
LEFT SFA DIST VEL RATIO: 0.54
LEFT SFA MID VEL RATIO: 0.8
LEFT SFA PROX VEL RATIO: 1.22
LEFT SUPER FEMORAL DIST SYS PSV: 57.6 CM/S
LEFT SUPER FEMORAL MID SYS PSV: 105.9 CM/S
LEFT SUPER FEMORAL PROX SYS PSV: 132.2 CM/S
RIGHT CFA DIST SYS PSV: 105.9 CM/S
RIGHT DIST ATA VELOCITY: 62.6 CM/S
RIGHT DIST PTA PSV: 32.3 CM/S
RIGHT POP A PROX VEL RATIO: 0.81
RIGHT POPLITEAL DIST SYS PSV: 79 CM/S
RIGHT POPLITEAL PROX SYS PSV: 69.9 CM/S
RIGHT PROX PFA A PSV: 49.4 CM/S
RIGHT SFA DIST VEL RATIO: 0.72
RIGHT SFA MID VEL RATIO: 1.2
RIGHT SFA PROX VEL RATIO: 0.9
RIGHT SUPER FEMORAL DIST SYS PSV: 86.3 CM/S
RIGHT SUPER FEMORAL MID SYS PSV: 119.2 CM/S
RIGHT SUPER FEMORAL PROX SYS PSV: 99.8 CM/S

## 2023-04-21 ENCOUNTER — APPOINTMENT (OUTPATIENT)
Facility: HOSPITAL | Age: 78
End: 2023-04-21
Payer: MEDICARE

## 2023-04-21 ENCOUNTER — HOSPITAL ENCOUNTER (EMERGENCY)
Facility: HOSPITAL | Age: 78
Discharge: HOME | End: 2023-04-21
Attending: EMERGENCY MEDICINE
Payer: MEDICARE

## 2023-04-21 VITALS
RESPIRATION RATE: 16 BRPM | WEIGHT: 150 LBS | BODY MASS INDEX: 18.27 KG/M2 | TEMPERATURE: 97.1 F | HEIGHT: 76 IN | DIASTOLIC BLOOD PRESSURE: 78 MMHG | SYSTOLIC BLOOD PRESSURE: 136 MMHG | OXYGEN SATURATION: 100 % | HEART RATE: 73 BPM

## 2023-04-21 DIAGNOSIS — S52.501A CLOSED FRACTURE OF DISTAL END OF RIGHT RADIUS, UNSPECIFIED FRACTURE MORPHOLOGY, INITIAL ENCOUNTER: Primary | ICD-10-CM

## 2023-04-21 PROCEDURE — 99283 EMERGENCY DEPT VISIT LOW MDM: CPT

## 2023-04-21 PROCEDURE — 73110 X-RAY EXAM OF WRIST: CPT

## 2023-04-21 ASSESSMENT — PAIN - FUNCTIONAL ASSESSMENT: PAIN_FUNCTIONAL_ASSESSMENT: NONE - DENIES PAIN

## 2023-04-21 NOTE — ED TRIAGE NOTES
Pt arrives via wheelchair. Pt had a fall from bed over 3 weeks ago, his R wrist was X Ray last week, wife reports she received a phone call yesterday it was fractured. Wife very agitated \"I said Im just Preston Rausch go to the Er they are taking too long\"   Pts wrist is turned inwards, wife reports that is baseline since a stroke years ago. No open wounds noted.

## 2023-04-22 NOTE — DISCHARGE INSTRUCTIONS
RICE treatment as discussed  Use Tylenol OTC package directions to help with pain  Splint placed today, will need to see Ortho as soon as possible for follow-up, call for first available appointment  Return to care for new or worsening symptoms to include increased swelling, coolness or paleness of fingers, decreased capillary refill as demonstrated or other concerning symptoms

## 2023-04-22 NOTE — ED PROVIDER NOTES
PERCUTANEOUS  2016    OTHER SURGICAL HISTORY      Nephrolithotomy    ND CARDIAC SURG PROCEDURE UNLIST  [id number]    angioplasty and stenting    PROSTATECTOMY  2003    PTCA  2009       Family History:  No family history on file. Social History:  Social History     Socioeconomic History    Marital status:    Tobacco Use    Smoking status: Former     Packs/day: 1.00     Types: Cigarettes     Quit date: 3/31/2002     Years since quittin.0    Smokeless tobacco: Never   Substance and Sexual Activity    Alcohol use: No    Drug use: No       Allergies:  No Known Allergies    CURRENT MEDICATIONS      No current facility-administered medications for this encounter. Current Outpatient Medications   Medication Sig Dispense Refill    aspirin 81 MG EC tablet Take 81 mg by mouth daily      atorvastatin (LIPITOR) 20 MG tablet Take 20 mg by mouth daily      Cholecalciferol 50 MCG (2000 UT) TABS Take 5,000 Units by mouth daily      Cinnamon 500 MG CAPS Take 2,000 mg by mouth daily      coenzyme Q10 200 MG CAPS capsule Take 400 mg by mouth daily      glipiZIDE (GLUCOTROL) 5 MG tablet Take 2.5 mg by mouth daily      hydrALAZINE (APRESOLINE) 25 MG tablet Take 25 mg by mouth 2 times daily      isosorbide mononitrate (IMDUR) 30 MG extended release tablet Take 30 mg by mouth daily      metoprolol tartrate (LOPRESSOR) 25 MG tablet Take 25 mg by mouth in the morning and 25 mg in the evening.       omeprazole (PRILOSEC OTC) 20 MG tablet Take 20 mg by mouth 2 times daily      spironolactone (ALDACTONE) 25 MG tablet Take 25 mg by mouth daily      warfarin (COUMADIN) 3 MG tablet Take 6 mg by mouth daily      warfarin (COUMADIN) 6 MG tablet Take 6 mg by mouth daily            PHYSICAL EXAM      Vitals:    23 1938   BP: 136/78   Pulse: 73   Resp: 16   Temp: 97.1 °F (36.2 °C)   TempSrc: Tympanic   SpO2: 100%   Weight: 150 lb (68 kg)   Height: 6' 4\" (1.93 m)     Physical Exam  Constitutional:       General: He is not

## (undated) DEVICE — SPONGE GZ W4XL4IN COT 12 PLY TYP VII WVN C FLD DSGN

## (undated) DEVICE — MEDI-VAC NON-CONDUCTIVE SUCTION TUBING: Brand: CARDINAL HEALTH

## (undated) DEVICE — MOUTHPIECE ENDOSCP 20X27MM --

## (undated) DEVICE — TRAP SPEC COLL POLYP POLYSTYR --

## (undated) DEVICE — SINGLE PORT MANIFOLD: Brand: NEPTUNE 2

## (undated) DEVICE — MAJ-1414 SINGLE USE ADPATER BIOPSY VALV: Brand: SINGLE USE ADAPTOR BIOPSY VALVE

## (undated) DEVICE — GARMENT,MEDLINE,DVT,INT,CALF,MED, GEN2: Brand: MEDLINE

## (undated) DEVICE — ENDO CARRY-ON PROCEDURE KIT INCLUDES ENZYMATIC SPONGE, GAUZE, BIOHAZARD LABEL, TRAY, LUBRICANT, DIRTY SCOPE LABEL, WATER LABEL, TRAY, DRAWSTRING PAD, AND DEFENDO 4-PIECE KIT.: Brand: ENDO CARRY-ON PROCEDURE KIT

## (undated) DEVICE — REM POLYHESIVE ADULT PATIENT RETURN ELECTRODE: Brand: VALLEYLAB

## (undated) DEVICE — KENDALL RADIOLUCENT FOAM MONITORING ELECTRODE RECTANGULAR SHAPE: Brand: KENDALL